# Patient Record
Sex: FEMALE | Race: WHITE | NOT HISPANIC OR LATINO | Employment: FULL TIME | URBAN - NONMETROPOLITAN AREA
[De-identification: names, ages, dates, MRNs, and addresses within clinical notes are randomized per-mention and may not be internally consistent; named-entity substitution may affect disease eponyms.]

---

## 2017-03-02 ENCOUNTER — HOSPITAL ENCOUNTER (EMERGENCY)
Facility: HOSPITAL | Age: 47
Discharge: HOME | End: 2017-03-02
Attending: EMERGENCY MEDICINE | Admitting: EMERGENCY MEDICINE
Payer: COMMERCIAL

## 2017-03-02 VITALS
TEMPERATURE: 99.1 F | HEIGHT: 67 IN | WEIGHT: 130.07 LBS | DIASTOLIC BLOOD PRESSURE: 66 MMHG | OXYGEN SATURATION: 98 % | HEART RATE: 84 BPM | BODY MASS INDEX: 20.42 KG/M2 | SYSTOLIC BLOOD PRESSURE: 112 MMHG | RESPIRATION RATE: 16 BRPM

## 2017-03-02 DIAGNOSIS — J32.1 FRONTAL SINUSITIS: Primary | ICD-10-CM

## 2017-03-02 DIAGNOSIS — H65.90 SEROUS OTITIS MEDIA: ICD-10-CM

## 2017-03-02 PROCEDURE — 99283 EMERGENCY DEPT VISIT LOW MDM: CPT

## 2017-03-02 RX ORDER — PREDNISONE 20 MG/1
TABLET ORAL
Qty: 15 TABLET | Refills: 0 | Status: SHIPPED | OUTPATIENT
Start: 2017-03-02 | End: 2017-05-05 | Stop reason: ALTCHOICE

## 2017-05-05 ENCOUNTER — HOSPITAL ENCOUNTER (EMERGENCY)
Facility: HOSPITAL | Age: 47
Discharge: HOME | End: 2017-05-05
Attending: EMERGENCY MEDICINE | Admitting: EMERGENCY MEDICINE
Payer: COMMERCIAL

## 2017-05-05 VITALS
OXYGEN SATURATION: 100 % | BODY MASS INDEX: 20.89 KG/M2 | SYSTOLIC BLOOD PRESSURE: 143 MMHG | HEART RATE: 77 BPM | WEIGHT: 130 LBS | HEIGHT: 66 IN | RESPIRATION RATE: 16 BRPM | TEMPERATURE: 98.2 F | DIASTOLIC BLOOD PRESSURE: 71 MMHG

## 2017-05-05 DIAGNOSIS — J06.9 URI (UPPER RESPIRATORY INFECTION): ICD-10-CM

## 2017-05-05 DIAGNOSIS — J02.9 PHARYNGITIS: Primary | ICD-10-CM

## 2017-05-05 PROCEDURE — 99283 EMERGENCY DEPT VISIT LOW MDM: CPT

## 2017-05-05 RX ORDER — GUAIFENESIN, PSEUDOEPHEDRINE HYDROCHLORIDE 600; 60 MG/1; MG/1
1 TABLET, EXTENDED RELEASE ORAL EVERY 12 HOURS
Qty: 20 TABLET | Refills: 0 | Status: SHIPPED | OUTPATIENT
Start: 2017-05-05 | End: 2017-06-04

## 2017-05-05 RX ORDER — AMOXICILLIN AND CLAVULANATE POTASSIUM 875; 125 MG/1; MG/1
1 TABLET, FILM COATED ORAL EVERY 12 HOURS
Qty: 20 TABLET | Refills: 0 | Status: SHIPPED | OUTPATIENT
Start: 2017-05-05 | End: 2017-05-15

## 2017-05-19 ENCOUNTER — APPOINTMENT (EMERGENCY)
Dept: ULTRASOUND IMAGING | Facility: HOSPITAL | Age: 47
End: 2017-05-19
Payer: COMMERCIAL

## 2017-05-19 ENCOUNTER — APPOINTMENT (EMERGENCY)
Dept: RADIOLOGY | Facility: HOSPITAL | Age: 47
End: 2017-05-19
Payer: COMMERCIAL

## 2017-05-19 ENCOUNTER — HOSPITAL ENCOUNTER (EMERGENCY)
Facility: HOSPITAL | Age: 47
Discharge: HOME | End: 2017-05-19
Admitting: EMERGENCY MEDICINE
Payer: COMMERCIAL

## 2017-05-19 VITALS
HEART RATE: 74 BPM | DIASTOLIC BLOOD PRESSURE: 70 MMHG | OXYGEN SATURATION: 99 % | HEIGHT: 66 IN | WEIGHT: 129.13 LBS | SYSTOLIC BLOOD PRESSURE: 136 MMHG | RESPIRATION RATE: 17 BRPM | TEMPERATURE: 99 F | BODY MASS INDEX: 20.75 KG/M2

## 2017-05-19 DIAGNOSIS — M25.561 MEDIAL JOINT LINE TENDERNESS OF RIGHT KNEE: Primary | ICD-10-CM

## 2017-05-19 PROCEDURE — 99284 EMERGENCY DEPT VISIT MOD MDM: CPT

## 2017-05-19 PROCEDURE — 93971 EXTREMITY STUDY: CPT

## 2017-05-19 PROCEDURE — 73564 X-RAY EXAM KNEE 4 OR MORE: CPT

## 2017-05-23 ENCOUNTER — HOSPITAL ENCOUNTER (EMERGENCY)
Facility: HOSPITAL | Age: 47
Discharge: HOME | End: 2017-05-23
Payer: COMMERCIAL

## 2017-05-23 VITALS
HEART RATE: 60 BPM | BODY MASS INDEX: 20.76 KG/M2 | WEIGHT: 129.19 LBS | HEIGHT: 66 IN | OXYGEN SATURATION: 100 % | DIASTOLIC BLOOD PRESSURE: 75 MMHG | TEMPERATURE: 99.1 F | RESPIRATION RATE: 17 BRPM | SYSTOLIC BLOOD PRESSURE: 128 MMHG

## 2017-05-23 DIAGNOSIS — R55 NEAR SYNCOPE: Primary | ICD-10-CM

## 2017-05-23 LAB
ANION GAP SERPL CALCULATED.3IONS-SCNC: 9 MMOL/L (ref 4–13)
ATRIAL RATE: 67 BPM
BASOPHILS # BLD AUTO: 0.01 THOUSANDS/ΜL (ref 0–0.1)
BASOPHILS NFR BLD AUTO: 0 % (ref 0–1)
BUN SERPL-MCNC: 14 MG/DL (ref 5–25)
CALCIUM SERPL-MCNC: 9.5 MG/DL (ref 8.3–10.1)
CHLORIDE SERPL-SCNC: 103 MMOL/L (ref 100–108)
CO2 SERPL-SCNC: 27 MMOL/L (ref 21–32)
CREAT SERPL-MCNC: 0.82 MG/DL (ref 0.6–1.3)
EOSINOPHIL # BLD AUTO: 0.11 THOUSAND/ΜL (ref 0–0.61)
EOSINOPHIL NFR BLD AUTO: 1 % (ref 0–6)
ERYTHROCYTE [DISTWIDTH] IN BLOOD BY AUTOMATED COUNT: 11.9 % (ref 11.6–15.1)
GFR SERPL CREATININE-BSD FRML MDRD: >60 ML/MIN/1.73SQ M
GLUCOSE SERPL-MCNC: 98 MG/DL (ref 65–140)
HCT VFR BLD AUTO: 42.7 % (ref 34.8–46.1)
HGB BLD-MCNC: 14 G/DL (ref 11.5–15.4)
HOLD SPECIMEN: NORMAL
LYMPHOCYTES # BLD AUTO: 3.53 THOUSANDS/ΜL (ref 0.6–4.47)
LYMPHOCYTES NFR BLD AUTO: 35 % (ref 14–44)
MCH RBC QN AUTO: 31.7 PG (ref 26.8–34.3)
MCHC RBC AUTO-ENTMCNC: 32.8 G/DL (ref 31.4–37.4)
MCV RBC AUTO: 97 FL (ref 82–98)
MONOCYTES # BLD AUTO: 0.72 THOUSAND/ΜL (ref 0.17–1.22)
MONOCYTES NFR BLD AUTO: 7 % (ref 4–12)
NEUTROPHILS # BLD AUTO: 5.67 THOUSANDS/ΜL (ref 1.85–7.62)
NEUTS SEG NFR BLD AUTO: 57 % (ref 43–75)
P AXIS: 63 DEGREES
PLATELET # BLD AUTO: 299 THOUSANDS/UL (ref 149–390)
PMV BLD AUTO: 9.3 FL (ref 8.9–12.7)
POTASSIUM SERPL-SCNC: 3.5 MMOL/L (ref 3.5–5.3)
PR INTERVAL: 156 MS
QRS AXIS: 45 DEGREES
QRSD INTERVAL: 122 MS
QT INTERVAL: 424 MS
QTC INTERVAL: 448 MS
RBC # BLD AUTO: 4.41 MILLION/UL (ref 3.81–5.12)
SODIUM SERPL-SCNC: 139 MMOL/L (ref 136–145)
T WAVE AXIS: 74 DEGREES
TROPONIN I SERPL-MCNC: <0.02 NG/ML
VENTRICULAR RATE: 67 BPM
WBC # BLD AUTO: 10.04 THOUSAND/UL (ref 4.31–10.16)

## 2017-05-23 PROCEDURE — 80048 BASIC METABOLIC PNL TOTAL CA: CPT

## 2017-05-23 PROCEDURE — 84484 ASSAY OF TROPONIN QUANT: CPT | Performed by: PHYSICIAN ASSISTANT

## 2017-05-23 PROCEDURE — 99284 EMERGENCY DEPT VISIT MOD MDM: CPT

## 2017-05-23 PROCEDURE — 85025 COMPLETE CBC W/AUTO DIFF WBC: CPT

## 2017-05-23 PROCEDURE — 93005 ELECTROCARDIOGRAM TRACING: CPT | Performed by: PHYSICIAN ASSISTANT

## 2017-05-23 PROCEDURE — 36415 COLL VENOUS BLD VENIPUNCTURE: CPT | Performed by: PHYSICIAN ASSISTANT

## 2017-05-31 ENCOUNTER — TRANSCRIBE ORDERS (OUTPATIENT)
Dept: ADMINISTRATIVE | Facility: HOSPITAL | Age: 47
End: 2017-05-31

## 2017-05-31 ENCOUNTER — ALLSCRIPTS OFFICE VISIT (OUTPATIENT)
Dept: OTHER | Facility: OTHER | Age: 47
End: 2017-05-31

## 2017-05-31 DIAGNOSIS — M25.50 PAIN IN JOINT: ICD-10-CM

## 2017-05-31 DIAGNOSIS — M23.91 INTERNAL DERANGEMENT OF RIGHT KNEE: ICD-10-CM

## 2017-05-31 DIAGNOSIS — M23.91 DERANGEMENT OF COLLATERAL LIGAMENT OF RIGHT KNEE: Primary | ICD-10-CM

## 2017-06-05 ENCOUNTER — LAB (OUTPATIENT)
Dept: LAB | Facility: HOSPITAL | Age: 47
End: 2017-06-05
Attending: ORTHOPAEDIC SURGERY
Payer: COMMERCIAL

## 2017-06-05 ENCOUNTER — TRANSCRIBE ORDERS (OUTPATIENT)
Dept: ADMINISTRATIVE | Facility: HOSPITAL | Age: 47
End: 2017-06-05

## 2017-06-05 DIAGNOSIS — M23.91 INTERNAL DERANGEMENT OF RIGHT KNEE: ICD-10-CM

## 2017-06-05 LAB
ANION GAP SERPL CALCULATED.3IONS-SCNC: 8 MMOL/L (ref 4–13)
BASOPHILS # BLD AUTO: 0.02 THOUSANDS/ΜL (ref 0–0.1)
BASOPHILS NFR BLD AUTO: 0 % (ref 0–1)
BUN SERPL-MCNC: 14 MG/DL (ref 5–25)
CALCIUM SERPL-MCNC: 9.3 MG/DL (ref 8.3–10.1)
CHLORIDE SERPL-SCNC: 103 MMOL/L (ref 100–108)
CO2 SERPL-SCNC: 28 MMOL/L (ref 21–32)
CREAT SERPL-MCNC: 0.85 MG/DL (ref 0.6–1.3)
CRP SERPL QL: <3 MG/L
EOSINOPHIL # BLD AUTO: 0.17 THOUSAND/ΜL (ref 0–0.61)
EOSINOPHIL NFR BLD AUTO: 2 % (ref 0–6)
ERYTHROCYTE [DISTWIDTH] IN BLOOD BY AUTOMATED COUNT: 11.8 % (ref 11.6–15.1)
GFR SERPL CREATININE-BSD FRML MDRD: >60 ML/MIN/1.73SQ M
GLUCOSE SERPL-MCNC: 82 MG/DL (ref 65–140)
HCT VFR BLD AUTO: 41.1 % (ref 34.8–46.1)
HGB BLD-MCNC: 13.5 G/DL (ref 11.5–15.4)
LYMPHOCYTES # BLD AUTO: 3.64 THOUSANDS/ΜL (ref 0.6–4.47)
LYMPHOCYTES NFR BLD AUTO: 39 % (ref 14–44)
MCH RBC QN AUTO: 32 PG (ref 26.8–34.3)
MCHC RBC AUTO-ENTMCNC: 32.8 G/DL (ref 31.4–37.4)
MCV RBC AUTO: 97 FL (ref 82–98)
MONOCYTES # BLD AUTO: 0.75 THOUSAND/ΜL (ref 0.17–1.22)
MONOCYTES NFR BLD AUTO: 8 % (ref 4–12)
NEUTROPHILS # BLD AUTO: 4.8 THOUSANDS/ΜL (ref 1.85–7.62)
NEUTS SEG NFR BLD AUTO: 51 % (ref 43–75)
PLATELET # BLD AUTO: 342 THOUSANDS/UL (ref 149–390)
PMV BLD AUTO: 9.3 FL (ref 8.9–12.7)
POTASSIUM SERPL-SCNC: 3.9 MMOL/L (ref 3.5–5.3)
RBC # BLD AUTO: 4.22 MILLION/UL (ref 3.81–5.12)
SODIUM SERPL-SCNC: 139 MMOL/L (ref 136–145)
WBC # BLD AUTO: 9.38 THOUSAND/UL (ref 4.31–10.16)

## 2017-06-05 PROCEDURE — 85025 COMPLETE CBC W/AUTO DIFF WBC: CPT

## 2017-06-05 PROCEDURE — 86618 LYME DISEASE ANTIBODY: CPT

## 2017-06-05 PROCEDURE — 80048 BASIC METABOLIC PNL TOTAL CA: CPT

## 2017-06-05 PROCEDURE — 86140 C-REACTIVE PROTEIN: CPT

## 2017-06-05 PROCEDURE — 86430 RHEUMATOID FACTOR TEST QUAL: CPT

## 2017-06-05 PROCEDURE — 36415 COLL VENOUS BLD VENIPUNCTURE: CPT

## 2017-06-06 LAB — RHEUMATOID FACT SER QL LA: NEGATIVE

## 2017-06-07 LAB
B BURGDOR IGG SER IA-ACNC: 0.09
B BURGDOR IGM SER IA-ACNC: 0.15

## 2017-07-07 ENCOUNTER — OFFICE VISIT (OUTPATIENT)
Dept: URGENT CARE | Facility: CLINIC | Age: 47
End: 2017-07-07
Payer: COMMERCIAL

## 2017-07-07 PROCEDURE — 87430 STREP A AG IA: CPT

## 2017-07-07 PROCEDURE — G0382 LEV 3 HOSP TYPE B ED VISIT: HCPCS

## 2017-07-07 PROCEDURE — 99283 EMERGENCY DEPT VISIT LOW MDM: CPT

## 2018-01-14 VITALS
BODY MASS INDEX: 20.25 KG/M2 | HEIGHT: 67 IN | WEIGHT: 129 LBS | DIASTOLIC BLOOD PRESSURE: 74 MMHG | SYSTOLIC BLOOD PRESSURE: 118 MMHG | HEART RATE: 87 BPM

## 2020-05-06 LAB — EXT SARS-COV-2: NOT DETECTED

## 2020-07-07 ENCOUNTER — OFFICE VISIT (OUTPATIENT)
Dept: FAMILY MEDICINE CLINIC | Facility: CLINIC | Age: 50
End: 2020-07-07
Payer: COMMERCIAL

## 2020-07-07 VITALS
HEIGHT: 67 IN | DIASTOLIC BLOOD PRESSURE: 68 MMHG | SYSTOLIC BLOOD PRESSURE: 134 MMHG | BODY MASS INDEX: 23.7 KG/M2 | RESPIRATION RATE: 20 BRPM | HEART RATE: 84 BPM | WEIGHT: 151 LBS

## 2020-07-07 DIAGNOSIS — J45.40 MODERATE PERSISTENT ASTHMA WITHOUT COMPLICATION: ICD-10-CM

## 2020-07-07 DIAGNOSIS — F31.70 BIPOLAR DISORDER IN FULL REMISSION, MOST RECENT EPISODE UNSPECIFIED TYPE (HCC): Primary | ICD-10-CM

## 2020-07-07 PROCEDURE — 99213 OFFICE O/P EST LOW 20 MIN: CPT | Performed by: FAMILY MEDICINE

## 2020-07-07 PROCEDURE — 3008F BODY MASS INDEX DOCD: CPT | Performed by: FAMILY MEDICINE

## 2020-07-07 PROCEDURE — 1036F TOBACCO NON-USER: CPT | Performed by: FAMILY MEDICINE

## 2020-07-07 RX ORDER — LITHIUM CARBONATE 300 MG/1
300 CAPSULE ORAL
COMMUNITY
End: 2020-07-07 | Stop reason: SDUPTHER

## 2020-07-07 RX ORDER — ALPRAZOLAM 0.25 MG/1
0.25 TABLET ORAL AS NEEDED
Qty: 90 TABLET | Refills: 0 | Status: SHIPPED | OUTPATIENT
Start: 2020-07-07 | End: 2020-09-23 | Stop reason: SDUPTHER

## 2020-07-07 RX ORDER — CLONIDINE HYDROCHLORIDE 0.2 MG/1
0.2 TABLET ORAL DAILY
Qty: 30 TABLET | Refills: 2 | Status: SHIPPED | OUTPATIENT
Start: 2020-07-07 | End: 2020-09-21

## 2020-07-07 RX ORDER — FLUTICASONE PROPIONATE 44 UG/1
2 AEROSOL, METERED RESPIRATORY (INHALATION) 2 TIMES DAILY
Qty: 1 INHALER | Refills: 5 | Status: SHIPPED | OUTPATIENT
Start: 2020-07-07 | End: 2020-09-23

## 2020-07-07 RX ORDER — LITHIUM CARBONATE 300 MG/1
300 CAPSULE ORAL
Qty: 90 CAPSULE | Refills: 2 | Status: SHIPPED | OUTPATIENT
Start: 2020-07-07 | End: 2020-09-23 | Stop reason: SDUPTHER

## 2020-07-07 NOTE — PROGRESS NOTES
Assessment/Plan:  Chronic cough and a previous smoker plan and a chest x-ray and pulmonary function test the patient has been using Atrovent with some success but there is no full documentation  Bipolar disorder controlled on current regimen and anxiety with panic controlled with Apresoline    Problem List Items Addressed This Visit     None           There are no diagnoses linked to this encounter  No problem-specific Assessment & Plan notes found for this encounter  PHQ-9 Depression Screening    PHQ-9:    Frequency of the following problems over the past two weeks:       Little interest or pleasure in doing things:  0 - not at all  Feeling down, depressed, or hopeless:  0 - not at all  PHQ-2 Score:  0          Body mass index is 23 65 kg/m²  BMI Counseling: Body mass index is 23 65 kg/m²  The BMI     Subjective:      Patient ID: Robson Alba is a 44 y o  female  States that she has a persistent cough times months      The following portions of the patient's history were reviewed and updated as appropriate:   She has a past medical history of ADD (attention deficit disorder), Colitis, ulcerative ([address] ), and Depression  ,  does not have a problem list on file  ,   has a past surgical history that includes Tubal ligation and Endometrial ablation  ,  family history is not on file  ,   reports that she quit smoking about 6 years ago  She has never used smokeless tobacco  She reports that she does not drink alcohol or use drugs  ,  has No Known Allergies     Current Outpatient Medications   Medication Sig Dispense Refill    ALPRAZolam (XANAX) 0 25 mg tablet Take 0 25 mg by mouth as needed for anxiety   cloNIDine (CATAPRES) 0 2 mg tablet Take 0 2 mg by mouth daily       Conj Estrog-Medroxyprogest Ace (PREMPRO PO) Take 1 tablet by mouth daily        ipratropium (ATROVENT HFA) 17 mcg/act inhaler Inhale 2 puffs 4 (four) times a day      lithium carbonate 300 mg capsule Take 300 mg by mouth 3 (three) times a day with meals      diclofenac sodium (VOLTAREN) 1 % Apply 2 g topically 4 (four) times a day for 7 days 56 g 0     No current facility-administered medications for this visit  Review of Systems   Constitutional: Negative  HENT: Negative  Eyes: Negative  Respiratory: Positive for cough  Cardiovascular: Negative  Gastrointestinal: Negative  Endocrine: Negative  Genitourinary: Negative  Musculoskeletal: Negative  Skin: Negative  Allergic/Immunologic: Negative  Neurological: Negative  Hematological: Negative  Psychiatric/Behavioral: The patient is nervous/anxious  Objective:    /68   Pulse 84   Resp 20   Ht 5' 7" (1 702 m)   Wt 68 5 kg (151 lb)   LMP 05/02/2016 (Approximate) Comment: pre-menopause  BMI 23 65 kg/m²   Body mass index is 23 65 kg/m²  Physical Exam   Constitutional: She is oriented to person, place, and time  She appears well-developed and well-nourished  HENT:   Head: Normocephalic  Eyes: Pupils are equal, round, and reactive to light  Neck: Normal range of motion  Cardiovascular: Normal rate, regular rhythm and normal heart sounds  Pulmonary/Chest: Effort normal and breath sounds normal    Abdominal: Soft  Bowel sounds are normal  There is no tenderness  Neurological: She is alert and oriented to person, place, and time  Skin: Skin is warm  Psychiatric: She has a normal mood and affect

## 2020-09-19 DIAGNOSIS — F31.70 BIPOLAR DISORDER IN FULL REMISSION, MOST RECENT EPISODE UNSPECIFIED TYPE (HCC): ICD-10-CM

## 2020-09-21 RX ORDER — CLONIDINE HYDROCHLORIDE 0.2 MG/1
TABLET ORAL
Qty: 30 TABLET | Refills: 2 | Status: SHIPPED | OUTPATIENT
Start: 2020-09-21 | End: 2020-09-23 | Stop reason: SDUPTHER

## 2020-09-23 ENCOUNTER — OFFICE VISIT (OUTPATIENT)
Dept: FAMILY MEDICINE CLINIC | Facility: CLINIC | Age: 50
End: 2020-09-23
Payer: COMMERCIAL

## 2020-09-23 VITALS
HEART RATE: 68 BPM | SYSTOLIC BLOOD PRESSURE: 130 MMHG | WEIGHT: 153 LBS | BODY MASS INDEX: 24.01 KG/M2 | TEMPERATURE: 99.2 F | DIASTOLIC BLOOD PRESSURE: 84 MMHG | RESPIRATION RATE: 20 BRPM | HEIGHT: 67 IN

## 2020-09-23 DIAGNOSIS — L40.9 PSORIASIS: Primary | ICD-10-CM

## 2020-09-23 DIAGNOSIS — F31.70 BIPOLAR DISORDER IN FULL REMISSION, MOST RECENT EPISODE UNSPECIFIED TYPE (HCC): ICD-10-CM

## 2020-09-23 PROCEDURE — 1036F TOBACCO NON-USER: CPT | Performed by: FAMILY MEDICINE

## 2020-09-23 PROCEDURE — 99213 OFFICE O/P EST LOW 20 MIN: CPT | Performed by: FAMILY MEDICINE

## 2020-09-23 RX ORDER — ALPRAZOLAM 0.25 MG/1
0.25 TABLET ORAL AS NEEDED
Qty: 90 TABLET | Refills: 0 | Status: SHIPPED | OUTPATIENT
Start: 2020-09-23 | End: 2020-12-09

## 2020-09-23 RX ORDER — FENOPROFEN CALCIUM 200 MG
CAPSULE ORAL 2 TIMES DAILY
Qty: 118 ML | Refills: 0 | Status: SHIPPED | OUTPATIENT
Start: 2020-09-23

## 2020-09-23 RX ORDER — CLONIDINE HYDROCHLORIDE 0.2 MG/1
0.2 TABLET ORAL DAILY
Qty: 30 TABLET | Refills: 2 | Status: SHIPPED | OUTPATIENT
Start: 2020-09-23 | End: 2020-12-09 | Stop reason: SDUPTHER

## 2020-09-23 RX ORDER — LITHIUM CARBONATE 300 MG/1
300 CAPSULE ORAL
Qty: 90 CAPSULE | Refills: 2 | Status: SHIPPED | OUTPATIENT
Start: 2020-09-23 | End: 2020-12-09 | Stop reason: SDUPTHER

## 2020-09-23 NOTE — PROGRESS NOTES
Assessment/Plan:    Problem List Items Addressed This Visit     None      Visit Diagnoses     Bipolar disorder in full remission, most recent episode unspecified type ([facility] [address] )        Relevant Medications    ALPRAZolam (XANAX) 0 25 mg tablet    cloNIDine (CATAPRES) 0 2 mg tablet    lithium carbonate 300 mg capsule           Diagnoses and all orders for this visit:    Bipolar disorder in full remission, most recent episode unspecified type ([facility])  -     ALPRAZolam (XANAX) 0 25 mg tablet; Take 1 tablet (0 25 mg total) by mouth as needed for anxiety  -     cloNIDine (CATAPRES) 0 2 mg tablet; Take 1 tablet (0 2 mg total) by mouth daily  -     lithium carbonate 300 mg capsule; Take 1 capsule (300 mg total) by mouth 3 (three) times a day with meals        No problem-specific Assessment & Plan notes found for this encounter  PHQ-9 Depression Screening    PHQ-9:    Frequency of the following problems over the past two weeks: Body mass index is 23 96 kg/m²  BMI Counseling: Body mass index is 23 96 kg/m²  The BMI     Subjective:      Patient ID: Jabier Severe is a 44 y o  female  Patient presents for routine checkup for probable or disorder      The following portions of the patient's history were reviewed and updated as appropriate:   She has a past medical history of ADD (attention deficit disorder), Colitis, ulcerative ([facility] [address] ), and Depression  ,  does not have a problem list on file  ,   has a past surgical history that includes Tubal ligation and Endometrial ablation  ,  family history is not on file  ,   reports that she quit smoking about 6 years ago  She has never used smokeless tobacco  She reports that she does not drink alcohol or use drugs  ,  has No Known Allergies     Current Outpatient Medications   Medication Sig Dispense Refill    ALPRAZolam (XANAX) 0 25 mg tablet Take 1 tablet (0 25 mg total) by mouth as needed for anxiety 90 tablet 0    cloNIDine (CATAPRES) 0 2 mg tablet Take 1 tablet (0 2 mg total) by mouth daily 30 tablet 2    Conj Estrog-Medroxyprogest Ace (PREMPRO PO) Take 1 tablet by mouth daily   ipratropium (ATROVENT HFA) 17 mcg/act inhaler Inhale 2 puffs 4 (four) times a day      lithium carbonate 300 mg capsule Take 1 capsule (300 mg total) by mouth 3 (three) times a day with meals 90 capsule 2     No current facility-administered medications for this visit  Review of Systems   Constitutional: Negative  HENT: Negative  Eyes: Negative  Respiratory: Negative  Cardiovascular: Negative  Gastrointestinal: Negative  Endocrine: Negative  Genitourinary: Negative  Musculoskeletal: Positive for arthralgias and myalgias  Skin: Negative  HE flaky rash at scalp on   Allergic/Immunologic: Negative  Neurological: Negative  Hematological: Negative  Psychiatric/Behavioral: The patient is nervous/anxious  Objective:    /84   Pulse 68   Temp 99 2 °F (37 3 °C)   Resp 20   Ht 5' 7" (1 702 m)   Wt 69 4 kg (153 lb)   LMP 05/02/2016 (Approximate) Comment: pre-menopause  BMI 23 96 kg/m²   Body mass index is 23 96 kg/m²  Physical Exam  Constitutional:       Appearance: She is well-developed  HENT:      Head: Normocephalic  Eyes:      Pupils: Pupils are equal, round, and reactive to light  Neck:      Musculoskeletal: Normal range of motion  Cardiovascular:      Rate and Rhythm: Normal rate and regular rhythm  Heart sounds: Normal heart sounds  Pulmonary:      Effort: Pulmonary effort is normal       Breath sounds: Normal breath sounds  Abdominal:      General: Bowel sounds are normal       Palpations: Abdomen is soft  Tenderness: There is no abdominal tenderness  Skin:     General: Skin is warm  Comments: Psoriatic like a rash to scalp   Neurological:      Mental Status: She is alert and oriented to person, place, and time

## 2020-10-12 ENCOUNTER — TELEPHONE (OUTPATIENT)
Dept: ADMINISTRATIVE | Facility: OTHER | Age: 50
End: 2020-10-12

## 2020-11-12 ENCOUNTER — TELEPHONE (OUTPATIENT)
Dept: FAMILY MEDICINE CLINIC | Facility: CLINIC | Age: 50
End: 2020-11-12

## 2020-11-12 DIAGNOSIS — Z13.220 SCREENING FOR CHOLESTEROL LEVEL: Primary | ICD-10-CM

## 2020-11-13 ENCOUNTER — TELEPHONE (OUTPATIENT)
Dept: ADMINISTRATIVE | Facility: OTHER | Age: 50
End: 2020-11-13

## 2020-12-09 ENCOUNTER — OFFICE VISIT (OUTPATIENT)
Dept: FAMILY MEDICINE CLINIC | Facility: CLINIC | Age: 50
End: 2020-12-09
Payer: COMMERCIAL

## 2020-12-09 VITALS
SYSTOLIC BLOOD PRESSURE: 126 MMHG | HEART RATE: 64 BPM | BODY MASS INDEX: 24.48 KG/M2 | WEIGHT: 156 LBS | OXYGEN SATURATION: 98 % | HEIGHT: 67 IN | TEMPERATURE: 97.7 F | RESPIRATION RATE: 20 BRPM | DIASTOLIC BLOOD PRESSURE: 84 MMHG

## 2020-12-09 DIAGNOSIS — F31.70 BIPOLAR DISORDER IN FULL REMISSION, MOST RECENT EPISODE UNSPECIFIED TYPE (HCC): ICD-10-CM

## 2020-12-09 DIAGNOSIS — F41.0 SEVERE ANXIETY WITH PANIC: ICD-10-CM

## 2020-12-09 DIAGNOSIS — E55.9 VITAMIN D DEFICIENCY: ICD-10-CM

## 2020-12-09 DIAGNOSIS — R11.0 NAUSEA: ICD-10-CM

## 2020-12-09 DIAGNOSIS — R52 PAIN: ICD-10-CM

## 2020-12-09 DIAGNOSIS — Z13.220 SCREENING FOR CHOLESTEROL LEVEL: ICD-10-CM

## 2020-12-09 DIAGNOSIS — J45.40 MODERATE PERSISTENT ASTHMA WITHOUT COMPLICATION: Primary | ICD-10-CM

## 2020-12-09 DIAGNOSIS — Z13.29 SCREENING FOR THYROID DISORDER: ICD-10-CM

## 2020-12-09 PROCEDURE — 3008F BODY MASS INDEX DOCD: CPT | Performed by: FAMILY MEDICINE

## 2020-12-09 PROCEDURE — 99214 OFFICE O/P EST MOD 30 MIN: CPT | Performed by: FAMILY MEDICINE

## 2020-12-09 PROCEDURE — 1036F TOBACCO NON-USER: CPT | Performed by: FAMILY MEDICINE

## 2020-12-09 RX ORDER — LITHIUM CARBONATE 300 MG/1
300 CAPSULE ORAL
Qty: 90 CAPSULE | Refills: 2 | Status: SHIPPED | OUTPATIENT
Start: 2020-12-09 | End: 2021-04-19

## 2020-12-09 RX ORDER — ALPRAZOLAM 0.25 MG/1
0.25 TABLET ORAL AS NEEDED
Qty: 90 TABLET | Refills: 0 | Status: CANCELLED | OUTPATIENT
Start: 2020-12-09

## 2020-12-09 RX ORDER — ALPRAZOLAM 0.5 MG/1
0.5 TABLET ORAL 3 TIMES DAILY PRN
Qty: 90 TABLET | Refills: 2 | Status: SHIPPED | OUTPATIENT
Start: 2020-12-09 | End: 2021-06-07 | Stop reason: SDUPTHER

## 2020-12-09 RX ORDER — ONDANSETRON 4 MG/1
4 TABLET, FILM COATED ORAL EVERY 8 HOURS PRN
Qty: 20 TABLET | Refills: 0 | Status: SHIPPED | OUTPATIENT
Start: 2020-12-09 | End: 2022-01-24 | Stop reason: SDUPTHER

## 2020-12-09 RX ORDER — CLONIDINE HYDROCHLORIDE 0.2 MG/1
0.2 TABLET ORAL DAILY
Qty: 30 TABLET | Refills: 2 | Status: SHIPPED | OUTPATIENT
Start: 2020-12-09 | End: 2021-05-17 | Stop reason: SDUPTHER

## 2020-12-29 ENCOUNTER — TELEPHONE (OUTPATIENT)
Dept: FAMILY MEDICINE CLINIC | Facility: CLINIC | Age: 50
End: 2020-12-29

## 2021-02-26 ENCOUNTER — TELEPHONE (OUTPATIENT)
Dept: FAMILY MEDICINE CLINIC | Facility: CLINIC | Age: 51
End: 2021-02-26

## 2021-03-30 ENCOUNTER — TELEPHONE (OUTPATIENT)
Dept: FAMILY MEDICINE CLINIC | Facility: CLINIC | Age: 51
End: 2021-03-30

## 2021-03-30 NOTE — TELEPHONE ENCOUNTER
Unable to contact patient by phone - will send message thru [facility]

## 2021-04-17 DIAGNOSIS — F31.70 BIPOLAR DISORDER IN FULL REMISSION, MOST RECENT EPISODE UNSPECIFIED TYPE (HCC): ICD-10-CM

## 2021-04-19 RX ORDER — LITHIUM CARBONATE 300 MG/1
CAPSULE ORAL
Qty: 90 CAPSULE | Refills: 2 | Status: SHIPPED | OUTPATIENT
Start: 2021-04-19 | End: 2021-06-07 | Stop reason: SDUPTHER

## 2021-05-17 DIAGNOSIS — F31.70 BIPOLAR DISORDER IN FULL REMISSION, MOST RECENT EPISODE UNSPECIFIED TYPE (HCC): ICD-10-CM

## 2021-05-17 RX ORDER — CLONIDINE HYDROCHLORIDE 0.2 MG/1
0.2 TABLET ORAL DAILY
Qty: 15 TABLET | Refills: 0 | Status: SHIPPED | OUTPATIENT
Start: 2021-05-17 | End: 2021-06-02

## 2021-05-17 NOTE — TELEPHONE ENCOUNTER
Made apt for 06/07/2021 @3:00  Tried to contact patient regarding refills she does have on her lithium - VM box is full

## 2021-06-02 DIAGNOSIS — F31.70 BIPOLAR DISORDER IN FULL REMISSION, MOST RECENT EPISODE UNSPECIFIED TYPE (HCC): ICD-10-CM

## 2021-06-02 RX ORDER — CLONIDINE HYDROCHLORIDE 0.2 MG/1
TABLET ORAL
Qty: 15 TABLET | Refills: 0 | Status: SHIPPED | OUTPATIENT
Start: 2021-06-02 | End: 2021-06-07 | Stop reason: SDUPTHER

## 2021-06-07 ENCOUNTER — OFFICE VISIT (OUTPATIENT)
Dept: FAMILY MEDICINE CLINIC | Facility: CLINIC | Age: 51
End: 2021-06-07
Payer: COMMERCIAL

## 2021-06-07 VITALS
HEIGHT: 67 IN | TEMPERATURE: 99.1 F | BODY MASS INDEX: 22.76 KG/M2 | RESPIRATION RATE: 20 BRPM | SYSTOLIC BLOOD PRESSURE: 124 MMHG | DIASTOLIC BLOOD PRESSURE: 68 MMHG | HEART RATE: 84 BPM | WEIGHT: 145 LBS

## 2021-06-07 DIAGNOSIS — J45.40 MODERATE PERSISTENT ASTHMA WITHOUT COMPLICATION: ICD-10-CM

## 2021-06-07 DIAGNOSIS — M54.50 CHRONIC BILATERAL LOW BACK PAIN WITHOUT SCIATICA: Primary | ICD-10-CM

## 2021-06-07 DIAGNOSIS — G89.29 CHRONIC BILATERAL LOW BACK PAIN WITHOUT SCIATICA: Primary | ICD-10-CM

## 2021-06-07 DIAGNOSIS — F41.0 SEVERE ANXIETY WITH PANIC: ICD-10-CM

## 2021-06-07 DIAGNOSIS — F31.70 BIPOLAR DISORDER IN FULL REMISSION, MOST RECENT EPISODE UNSPECIFIED TYPE (HCC): ICD-10-CM

## 2021-06-07 PROCEDURE — 1036F TOBACCO NON-USER: CPT | Performed by: FAMILY MEDICINE

## 2021-06-07 PROCEDURE — 99214 OFFICE O/P EST MOD 30 MIN: CPT | Performed by: FAMILY MEDICINE

## 2021-06-07 PROCEDURE — 3008F BODY MASS INDEX DOCD: CPT | Performed by: FAMILY MEDICINE

## 2021-06-07 RX ORDER — LITHIUM CARBONATE 300 MG/1
900 CAPSULE ORAL
Qty: 90 CAPSULE | Refills: 2 | Status: SHIPPED | OUTPATIENT
Start: 2021-06-07 | End: 2021-08-17 | Stop reason: SDUPTHER

## 2021-06-07 RX ORDER — CLONIDINE HYDROCHLORIDE 0.2 MG/1
0.2 TABLET ORAL DAILY
Qty: 90 TABLET | Refills: 1 | Status: SHIPPED | OUTPATIENT
Start: 2021-06-07 | End: 2021-11-29

## 2021-06-07 RX ORDER — ALPRAZOLAM 0.5 MG/1
0.5 TABLET ORAL 3 TIMES DAILY PRN
Qty: 90 TABLET | Refills: 2 | Status: SHIPPED | OUTPATIENT
Start: 2021-06-07 | End: 2021-08-17 | Stop reason: SDUPTHER

## 2021-06-07 NOTE — PROGRESS NOTES
Assessment/Plan: chronic anxiety with bipolar disorder medication improves function the patient is employed  Moderate persistent asthma without complication the patient uses rescue inhaler approximately 4 times per week pulmonary function test has been  Ordered    Bipolar disorder in full remission on current regimen    Chronic low back pain the patient is planning to return to chiropractic therapy      Problem List Items Addressed This Visit     None      Visit Diagnoses     Moderate persistent asthma without complication        Bipolar disorder in full remission, most recent episode unspecified type ([facility] )        Severe anxiety with panic               Diagnoses and all orders for this visit:    Moderate persistent asthma without complication  -     ipratropium (ATROVENT HFA) 17 mcg/act inhaler; Inhale 2 puffs 4 (four) times a day    Bipolar disorder in full remission, most recent episode unspecified type ([facility])  -     lithium carbonate 300 mg capsule; Take 3 capsules (900 mg total) by mouth daily at bedtime  -     cloNIDine (CATAPRES) 0 2 mg tablet; Take 1 tablet (0 2 mg total) by mouth daily    Severe anxiety with panic  -     ALPRAZolam (XANAX) 0 5 mg tablet; Take 1 tablet (0 5 mg total) by mouth 3 (three) times a day as needed for anxiety        No problem-specific Assessment & Plan notes found for this encounter  PHQ-9 Depression Screening    PHQ-9:   Frequency of the following problems over the past two weeks: Body mass index is 22 71 kg/m²  BMI Counseling: Body mass index is 22 71 kg/m²  The BMI     Subjective:      Patient ID: José Miguel Aguillon is a 46 y o  female  Patient presents for routine checkup      The following portions of the patient's history were reviewed and updated as appropriate:   She has a past medical history of ADD (attention deficit disorder), Colitis, ulcerative ([facility] ), and Depression  ,  does not have a problem list on file  ,   has a past surgical history that includes Tubal ligation and Endometrial ablation  ,  family history is not on file  ,   reports that she quit smoking about 7 years ago  She has never used smokeless tobacco  She reports that she does not drink alcohol or use drugs  ,  is allergic to dust mite extract     Current Outpatient Medications   Medication Sig Dispense Refill    ALPRAZolam (XANAX) 0 5 mg tablet Take 1 tablet (0 5 mg total) by mouth 3 (three) times a day as needed for anxiety 90 tablet 2    cloNIDine (CATAPRES) 0 2 mg tablet take 1 tablet by mouth daily 15 tablet 0    Conj Estrog-Medroxyprogest Ace (PREMPRO PO) Take 1 tablet by mouth daily   hydrocortisone 1 % lotion Apply topically 2 (two) times a day 118 mL 0    ipratropium (ATROVENT HFA) 17 mcg/act inhaler Inhale 2 puffs 4 (four) times a day 1 Inhaler 2    lithium carbonate 300 mg capsule take 1 capsule three times a day with meals 90 capsule 2    ondansetron (ZOFRAN) 4 mg tablet Take 1 tablet (4 mg total) by mouth every 8 (eight) hours as needed for nausea or vomiting 20 tablet 0     No current facility-administered medications for this visit  Review of Systems   Constitutional: Negative for chills and fever  HENT: Negative for ear pain and sore throat  Eyes: Negative for pain and visual disturbance  Respiratory: Positive for shortness of breath and wheezing  Negative for cough  Cardiovascular: Negative for chest pain and palpitations  Gastrointestinal: Negative for abdominal pain and vomiting  Genitourinary: Negative for dysuria and hematuria  Musculoskeletal: Positive for back pain  Negative for arthralgias  Skin: Negative for color change and rash  Neurological: Negative for seizures and syncope  All other systems reviewed and are negative          Objective:    /68   Pulse 84   Temp 99 1 °F (37 3 °C)   Resp 20   Ht 5' 7" (1 702 m)   Wt 65 8 kg (145 lb)   LMP 05/02/2016 (Approximate) Comment: pre-menopause  BMI 22 71 kg/m²   Body mass index is 22 71 kg/m²  Physical Exam  Constitutional:       Appearance: She is well-developed  HENT:      Head: Normocephalic  Eyes:      Pupils: Pupils are equal, round, and reactive to light  Neck:      Musculoskeletal: Normal range of motion  Cardiovascular:      Rate and Rhythm: Normal rate and regular rhythm  Heart sounds: Normal heart sounds  Pulmonary:      Effort: Pulmonary effort is normal       Breath sounds: Normal breath sounds  Abdominal:      General: Bowel sounds are normal       Palpations: Abdomen is soft  Tenderness: There is no abdominal tenderness  Musculoskeletal:      Comments: Paraspinal spasm of the lumbar spine   Skin:     General: Skin is warm  Neurological:      Mental Status: She is alert and oriented to person, place, and time

## 2021-07-08 ENCOUNTER — APPOINTMENT (OUTPATIENT)
Dept: RADIOLOGY | Facility: CLINIC | Age: 51
End: 2021-07-08
Payer: COMMERCIAL

## 2021-07-08 DIAGNOSIS — R52 PAIN: ICD-10-CM

## 2021-07-08 PROCEDURE — 72072 X-RAY EXAM THORAC SPINE 3VWS: CPT

## 2021-07-08 PROCEDURE — 72050 X-RAY EXAM NECK SPINE 4/5VWS: CPT

## 2021-07-08 PROCEDURE — 72110 X-RAY EXAM L-2 SPINE 4/>VWS: CPT

## 2021-07-12 ENCOUNTER — APPOINTMENT (OUTPATIENT)
Dept: LAB | Facility: CLINIC | Age: 51
End: 2021-07-12
Payer: COMMERCIAL

## 2021-07-12 DIAGNOSIS — R52 PAIN: ICD-10-CM

## 2021-07-12 DIAGNOSIS — E55.9 VITAMIN D DEFICIENCY: ICD-10-CM

## 2021-07-12 DIAGNOSIS — Z13.220 SCREENING FOR CHOLESTEROL LEVEL: ICD-10-CM

## 2021-07-12 DIAGNOSIS — Z13.29 SCREENING FOR THYROID DISORDER: ICD-10-CM

## 2021-07-12 DIAGNOSIS — F31.70 BIPOLAR DISORDER IN FULL REMISSION, MOST RECENT EPISODE UNSPECIFIED TYPE (HCC): ICD-10-CM

## 2021-07-12 LAB
25(OH)D3 SERPL-MCNC: 33 NG/ML (ref 30–100)
CHOLEST SERPL-MCNC: 213 MG/DL (ref 50–200)
CRP SERPL QL: <3 MG/L
HDLC SERPL-MCNC: 82 MG/DL
LDLC SERPL CALC-MCNC: 114 MG/DL (ref 0–100)
LITHIUM SERPL-SCNC: 0.8 MMOL/L (ref 0.5–1)
TRIGL SERPL-MCNC: 86 MG/DL
TSH SERPL DL<=0.05 MIU/L-ACNC: 3.78 UIU/ML (ref 0.36–3.74)

## 2021-07-12 PROCEDURE — 80178 ASSAY OF LITHIUM: CPT

## 2021-07-12 PROCEDURE — 80061 LIPID PANEL: CPT

## 2021-07-12 PROCEDURE — 82306 VITAMIN D 25 HYDROXY: CPT

## 2021-07-12 PROCEDURE — 86140 C-REACTIVE PROTEIN: CPT

## 2021-07-12 PROCEDURE — 36415 COLL VENOUS BLD VENIPUNCTURE: CPT

## 2021-07-12 PROCEDURE — 84443 ASSAY THYROID STIM HORMONE: CPT

## 2021-08-04 ENCOUNTER — NURSE TRIAGE (OUTPATIENT)
Dept: OTHER | Facility: OTHER | Age: 51
End: 2021-08-04

## 2021-08-04 NOTE — TELEPHONE ENCOUNTER
Reason for Disposition   Redness or swelling on the cheek, forehead or around the eye and fever    Answer Assessment - Initial Assessment Questions  1  ANTIBIOTIC: "What antibiotic are you receiving?" "How many times per day?"      Augmentin   2  ONSET: "When was the antibiotic started?"      2 weeks ago   3  PAIN: "How bad is the sinus pain?"   (Scale 1-10; mild, moderate or severe)    - MILD (1-3): doesn't interfere with normal activities     - MODERATE (4-7): interferes with normal activities (e g , work or school) or awakens from sleep    - SEVERE (8-10): excruciating pain and patient unable to do any normal activities         7-8 out of 10   4  FEVER: "Do you have a fever?" If so, ask: "What is it, how was it measured, and when did it start?"       Patient feels feverish, unable to obtain the temperature   5   SYMPTOMS: "Are there any other symptoms you're concerned about?" If so, ask: "When did it start?"       Sinus pain in right side of the face, ear ache right side, tingling in forehead    Protocols used: SINUS INFECTION ON ANTIBIOTIC FOLLOW-UP CALL-ADULT-[place]

## 2021-08-04 NOTE — TELEPHONE ENCOUNTER
Regarding: sinus pressure, rt ear/cheek pain, tingling forehead, green/bloody mucus nose, chills feels feverish  ----- Message from Bredna Elliott sent at 8/4/2021 11:02 AM EDT -----  "For over a week I have had bad sinus pressure, pain in right ear and cheek, my forehead tingles, blowing nose with green mucus that is a reddish brown also, chills, feverish    I was seen at Urgent Care over a week ago and was given Augmentin which has not worked "

## 2021-08-06 ENCOUNTER — TELEPHONE (OUTPATIENT)
Dept: OTHER | Facility: OTHER | Age: 51
End: 2021-08-06

## 2021-08-17 ENCOUNTER — OFFICE VISIT (OUTPATIENT)
Dept: FAMILY MEDICINE CLINIC | Facility: CLINIC | Age: 51
End: 2021-08-17
Payer: COMMERCIAL

## 2021-08-17 VITALS
SYSTOLIC BLOOD PRESSURE: 132 MMHG | HEART RATE: 84 BPM | RESPIRATION RATE: 20 BRPM | TEMPERATURE: 98.9 F | HEIGHT: 67 IN | WEIGHT: 143 LBS | BODY MASS INDEX: 22.44 KG/M2 | DIASTOLIC BLOOD PRESSURE: 76 MMHG

## 2021-08-17 DIAGNOSIS — F31.70 BIPOLAR DISORDER IN FULL REMISSION, MOST RECENT EPISODE UNSPECIFIED TYPE (HCC): ICD-10-CM

## 2021-08-17 DIAGNOSIS — R52 PAIN: ICD-10-CM

## 2021-08-17 DIAGNOSIS — M54.50 CHRONIC BILATERAL LOW BACK PAIN WITHOUT SCIATICA: ICD-10-CM

## 2021-08-17 DIAGNOSIS — F41.0 SEVERE ANXIETY WITH PANIC: ICD-10-CM

## 2021-08-17 DIAGNOSIS — J45.40 MODERATE PERSISTENT ASTHMA WITHOUT COMPLICATION: ICD-10-CM

## 2021-08-17 DIAGNOSIS — Z00.00 ANNUAL PHYSICAL EXAM: ICD-10-CM

## 2021-08-17 DIAGNOSIS — Z11.59 NEED FOR HEPATITIS C SCREENING TEST: ICD-10-CM

## 2021-08-17 DIAGNOSIS — Z11.4 SCREENING FOR HIV (HUMAN IMMUNODEFICIENCY VIRUS): ICD-10-CM

## 2021-08-17 DIAGNOSIS — G62.9 NEUROPATHY: Primary | ICD-10-CM

## 2021-08-17 DIAGNOSIS — E55.9 VITAMIN D DEFICIENCY: ICD-10-CM

## 2021-08-17 DIAGNOSIS — G89.29 CHRONIC NECK PAIN: ICD-10-CM

## 2021-08-17 DIAGNOSIS — M54.2 CHRONIC NECK PAIN: ICD-10-CM

## 2021-08-17 DIAGNOSIS — Z13.1 SCREENING FOR DIABETES MELLITUS: ICD-10-CM

## 2021-08-17 DIAGNOSIS — G89.29 CHRONIC BILATERAL LOW BACK PAIN WITHOUT SCIATICA: ICD-10-CM

## 2021-08-17 PROCEDURE — 99396 PREV VISIT EST AGE 40-64: CPT | Performed by: FAMILY MEDICINE

## 2021-08-17 PROCEDURE — 99213 OFFICE O/P EST LOW 20 MIN: CPT | Performed by: FAMILY MEDICINE

## 2021-08-17 RX ORDER — LITHIUM CARBONATE 300 MG/1
900 CAPSULE ORAL
Qty: 90 CAPSULE | Refills: 2 | Status: SHIPPED | OUTPATIENT
Start: 2021-08-17 | End: 2021-12-21 | Stop reason: SDUPTHER

## 2021-08-17 RX ORDER — ALPRAZOLAM 0.5 MG/1
0.5 TABLET ORAL 3 TIMES DAILY PRN
Qty: 90 TABLET | Refills: 2 | Status: SHIPPED | OUTPATIENT
Start: 2021-08-17 | End: 2022-01-24 | Stop reason: SDUPTHER

## 2021-08-17 RX ORDER — PYRIDOXINE HCL (VITAMIN B6) 100 MG
100 TABLET ORAL DAILY
Qty: 30 TABLET | Refills: 5 | Status: SHIPPED | OUTPATIENT
Start: 2021-08-17 | End: 2022-01-24 | Stop reason: SDUPTHER

## 2021-08-17 RX ORDER — ARIPIPRAZOLE 2 MG/1
2 TABLET ORAL DAILY
Qty: 30 TABLET | Refills: 3 | Status: SHIPPED | OUTPATIENT
Start: 2021-08-17 | End: 2022-01-14

## 2021-08-17 NOTE — PATIENT INSTRUCTIONS

## 2021-08-17 NOTE — PROGRESS NOTES
[address] PRIMARY CARE    NAME: Chidi Cutler  AGE: 46 y o  SEX: female  : 1970     DATE: 2021     Assessment and Plan: bipolar disorder with a therapeutic lithium level with depressive symptoms will add Abilify at a low dose of 2 mg per day    Chronic anxiety with panic Xanax is been effective therapy the PDMP has been reviewed no issues found  No evidence of divergence or abuse    Asthma uses Atrovent as rescue inhaler    Femoral cutaneous nerve syndrome with neuropathy will add B6 100 mg daily    Chronic pain of the neck back and right hip will refer to pain management   XR spine lumbar minimum 4 views non injury  Status: Final result   PACS ImagesXR spine cervical complete 4 or 5 vw non injury  Status: Final result   PACS Images     Show images for XR spine cervical complete 4 or 5 vw non injury   Study Result    Narrative & Impression   CERVICAL SPINE     INDICATION:   R52: Pain, unspecified      COMPARISON:  None     VIEWS:  XR SPINE CERVICAL COMPLETE 4 OR 5 VW NON INJURY         FINDINGS:     No fracture       Minimal anterolisthesis of C4 on C5 and C5 on C6      Disc heights are preserved  There are facet joint degenerative changes most prominent C4-5 through C6-7       Mild bilateral osseous neural foraminal narrowing at C4-5 and C5-6      The prevertebral soft tissues are within normal limits        The lung apices are clear      IMPRESSION:     No acute osseous abnormality      Degenerative changes as above         Workstation performed: [id number]      Imaging    XR spine cervical complete 4 or 5 vw non injury (Order: [id number]) - 2021  Result History    XR spine cervical complete 4 or 5 vw non injury (Order #[id number]) on 2021 - Order Result History Report   Order Report    Order Details  Order Questions    Question Answer Comment   Pregnant?  No           Reason for Exam    Dx: Pain Waldo Macleod (ICD-10-CM)] All Reviewers List    Janna Willard DO on 7/19/2021 11:09 AM   Signed by    Signed Date/Time  Phone Pager   Ny [place] 7/18/2021 15:24 [phone]    Exam Information    Status Exam Begun  Exam Ended  Performing Tech   Final [99] 7/08/2021 13:00 7/08/2021 14:49 Pal Flanagan   External Results Report    Open External Results Report    Encounter    View Encounter             Patient Care Timeline    No data selected in time range  Pre-op Summary    Pre-op           Recovery Summary    Recovery             Routing History    None          Show images for XR spine lumbar minimum 4 views non injury   Study Result    Narrative & Impression   LUMBAR SPINE     INDICATION:   R52: Pain, unspecified      COMPARISON:  None     VIEWS:  XR SPINE LUMBAR MINIMUM 4 VIEWS NON INJURY        FINDINGS:     There are 5 non rib bearing lumbar vertebral bodies       There is no evidence of acute fracture or destructive osseous lesion      Mild scoliotic deformity is noted  Alignment is otherwise unremarkable       There are mild endplate and facet joint degenerative changes at L3-4 through L5-S1      The pedicles appear intact      There are atherosclerotic calcifications  Soft tissues are otherwise unremarkable      IMPRESSION:     No acute osseous abnormality        Degenerative changes as described         Workstation performed: [id number]      Imaging    XR spine lumbar minimum 4 views non injury (Order: [id number]) - 7/8/2021  Result History    XR spine lumbar minimum 4 views non injury (Order #[id number]) on 7/18/2021 - Order Result History Report   Order Report    Order Details  Order Questions    Question Answer Comment   Pregnant?  No           Reason for Exam    Dx: Pain [G80 (ICD-10-CM)]   All Reviewers List    Janna Willard DO on 7/19/2021 11:09 AM   Signed by    Signed Date/Time  Phone Pager   Ny [place] 7/18/2021 18:09 [phone]    Exam Information    Status Exam Begun  Exam Ended  Performing Tech Final [99] 7/08/2021 13:00 7/08/2021 14:49 Gabrielajewellcosme Marmolejo   External Results Report    Open External Results Report    Encounter    View Encounter             Patient Care Timeline    No data selected in time range  Pre-op Summary    Pre-op           Recovery Summary    Recovery             Routing History    None       Problem List Items Addressed This Visit     None      Visit Diagnoses     Severe anxiety with panic        Bipolar disorder in full remission, most recent episode unspecified type ([facility] [address] )              Immunizations and preventive care screenings were discussed with patient today  Appropriate education was printed on patient's after visit summary  Counseling:  · Alcohol/drug use: discussed moderation in alcohol intake, the recommendations for healthy alcohol use, and avoidance of illicit drug use  No follow-ups on file  Chief Complaint:     Chief Complaint   Patient presents with    Annual Exam     per insurance     Medication Refill    Pain     head, neck, and back  Patient would like to return to Pain Management for injections  Patient has history with [facility], but patient is now living in [facility]     patient would like to return to ENT, chronic sinus infections, facial swelling, and ear ache with dizziness      History of Present Illness:     Adult Annual Physical   Patient here for a comprehensive physical exam  The patient reports no problems  Diet and Physical Activity  · Diet/Nutrition: well balanced diet  · Exercise: no formal exercise  Depression Screening  PHQ-9 Depression Screening    PHQ-9:   Frequency of the following problems over the past two weeks:      Little interest or pleasure in doing things: 0 - not at all  Feeling down, depressed, or hopeless: 0 - not at all  PHQ-2 Score: 0       General Health  · Sleep: sleeps poorly  · Hearing: normal - bilateral   · Vision: no vision problems  · Dental: regular dental visits  /GYN Health  · Patient is: postmenopausal  · Last menstrual periodyrs  · Contraceptive method: oral contraceptives, menopause  Review of Systems:     Review of Systems   Constitutional: Negative for chills and fever  HENT: Negative for ear pain and sore throat  Eyes: Negative for pain and visual disturbance  Respiratory: Negative for cough and shortness of breath  Cardiovascular: Negative for chest pain and palpitations  Gastrointestinal: Negative for abdominal pain and vomiting  Genitourinary: Negative for dysuria and hematuria  Musculoskeletal: Positive for back pain and neck pain  Negative for arthralgias  Severe neck and lower back pain pain is 6/7 out of 10   Skin: Negative for color change and rash  Neurological: Negative for seizures and syncope  Psychiatric/Behavioral: Positive for dysphoric mood  Has been having mood swings and depression states that she has been on Abilify in the past and it made her feel normal   All other systems reviewed and are negative       Past Medical History:     Past Medical History:   Diagnosis Date    ADD (attention deficit disorder)     Colitis, ulcerative ([address] )     Depression       Past Surgical History:     Past Surgical History:   Procedure Laterality Date    ENDOMETRIAL ABLATION      TUBAL LIGATION        Social History:     Social History     Socioeconomic History    Marital status:      Spouse name: None    Number of children: None    Years of education: None    Highest education level: None   Occupational History    None   Tobacco Use    Smoking status: Former Smoker     Quit date:      Years since quittin 6    Smokeless tobacco: Never Used   Substance and Sexual Activity    Alcohol use: No    Drug use: Never    Sexual activity: None   Other Topics Concern    None   Social History Narrative    None     Social Determinants of Health     Financial Resource Strain:     Difficulty of Paying Living Expenses:    Food Insecurity:     Worried About Running Out of Food in the Last Year:     [address] in the Last Year:    Transportation Needs:     Lack of Transportation (Medical):  Lack of Transportation (Non-Medical):    Physical Activity:     Days of Exercise per Week:     Minutes of Exercise per Session:    Stress:     Feeling of Stress :    Social Connections:     Frequency of Communication with Friends and Family:     Frequency of Social Gatherings with Friends and Family:     Attends [employer] Services:     Active Member of Clubs or Organizations:     Attends Club or Organization Meetings:     Marital Status:    Intimate Partner Violence:     Fear of Current or Ex-Partner:     Emotionally Abused:     Physically Abused:     Sexually Abused:       Family History:     No family history on file  Current Medications:     Current Outpatient Medications   Medication Sig Dispense Refill    ALPRAZolam (XANAX) 0 5 mg tablet Take 1 tablet (0 5 mg total) by mouth 3 (three) times a day as needed for anxiety 90 tablet 2    cloNIDine (CATAPRES) 0 2 mg tablet Take 1 tablet (0 2 mg total) by mouth daily 90 tablet 1    Conj Estrog-Medroxyprogest Ace (PREMPRO PO) Take 1 tablet by mouth daily   hydrocortisone 1 % lotion Apply topically 2 (two) times a day 118 mL 0    ipratropium (ATROVENT HFA) 17 mcg/act inhaler Inhale 2 puffs 4 (four) times a day 12 9 g 0    lithium carbonate 300 mg capsule Take 3 capsules (900 mg total) by mouth daily at bedtime 90 capsule 2    ondansetron (ZOFRAN) 4 mg tablet Take 1 tablet (4 mg total) by mouth every 8 (eight) hours as needed for nausea or vomiting 20 tablet 0     No current facility-administered medications for this visit  Allergies:      Allergies   Allergen Reactions    Dust Mite Extract Other (See Comments)     Environmental Allergy      Physical Exam:     /76   Pulse 84   Temp 98 9 °F (37 2 °C)   Resp 20   Ht 5' 7" (1 702 m)   Wt 64 9 kg (143 lb)   LMP 05/02/2016 (Approximate) Comment: pre-menopause  BMI 22 40 kg/m²     Physical Exam  Vitals and nursing note reviewed  Constitutional:       General: She is not in acute distress  Appearance: She is well-developed  HENT:      Head: Normocephalic and atraumatic  Eyes:      Conjunctiva/sclera: Conjunctivae normal    Cardiovascular:      Rate and Rhythm: Normal rate and regular rhythm  Heart sounds: No murmur heard  Pulmonary:      Effort: Pulmonary effort is normal  No respiratory distress  Breath sounds: Normal breath sounds  Abdominal:      Palpations: Abdomen is soft  Tenderness: There is no abdominal tenderness  Musculoskeletal:      Cervical back: Neck supple  Skin:     General: Skin is warm and dry  Neurological:      Mental Status: She is alert            Watt Simone [facility] PRIMARY CARE

## 2021-08-24 ENCOUNTER — TELEPHONE (OUTPATIENT)
Dept: FAMILY MEDICINE CLINIC | Facility: CLINIC | Age: 51
End: 2021-08-24

## 2021-08-24 DIAGNOSIS — J32.9 CHRONIC SINUSITIS, UNSPECIFIED LOCATION: Primary | ICD-10-CM

## 2021-08-24 NOTE — TELEPHONE ENCOUNTER
Patient states you both briefly talked about a ref to ENT for her chronic sinus and ear problem - advised I will put into system and if she does not hear from anyone to please contact the office

## 2021-09-07 ENCOUNTER — OFFICE VISIT (OUTPATIENT)
Dept: FAMILY MEDICINE CLINIC | Facility: CLINIC | Age: 51
End: 2021-09-07
Payer: COMMERCIAL

## 2021-09-07 VITALS
TEMPERATURE: 98.9 F | BODY MASS INDEX: 22.29 KG/M2 | HEART RATE: 84 BPM | SYSTOLIC BLOOD PRESSURE: 132 MMHG | RESPIRATION RATE: 20 BRPM | HEIGHT: 67 IN | WEIGHT: 142 LBS | DIASTOLIC BLOOD PRESSURE: 74 MMHG

## 2021-09-07 DIAGNOSIS — J32.0 CHRONIC MAXILLARY SINUSITIS: Primary | ICD-10-CM

## 2021-09-07 DIAGNOSIS — R11.0 NAUSEA: ICD-10-CM

## 2021-09-07 DIAGNOSIS — J45.40 MODERATE PERSISTENT ASTHMA WITHOUT COMPLICATION: ICD-10-CM

## 2021-09-07 DIAGNOSIS — G62.9 NEUROPATHY: ICD-10-CM

## 2021-09-07 DIAGNOSIS — R06.02 SHORTNESS OF BREATH: ICD-10-CM

## 2021-09-07 DIAGNOSIS — L30.9 DERMATITIS: ICD-10-CM

## 2021-09-07 DIAGNOSIS — T88.7XXA SIDE EFFECT OF DRUG: ICD-10-CM

## 2021-09-07 PROCEDURE — 99214 OFFICE O/P EST MOD 30 MIN: CPT | Performed by: FAMILY MEDICINE

## 2021-09-07 RX ORDER — GABAPENTIN 100 MG/1
100 CAPSULE ORAL 3 TIMES DAILY
COMMUNITY
End: 2021-09-07 | Stop reason: SDUPTHER

## 2021-09-07 RX ORDER — OMEPRAZOLE 20 MG/1
20 CAPSULE, DELAYED RELEASE ORAL
Qty: 90 CAPSULE | Refills: 3 | Status: SHIPPED | OUTPATIENT
Start: 2021-09-07 | End: 2022-01-24 | Stop reason: SDUPTHER

## 2021-09-07 RX ORDER — BENZTROPINE MESYLATE 0.5 MG/1
0.5 TABLET ORAL 2 TIMES DAILY
Qty: 60 TABLET | Refills: 5 | Status: SHIPPED | OUTPATIENT
Start: 2021-09-07 | End: 2022-01-24 | Stop reason: SDUPTHER

## 2021-09-07 RX ORDER — GABAPENTIN 100 MG/1
100 CAPSULE ORAL 3 TIMES DAILY
Qty: 90 CAPSULE | Refills: 5 | Status: SHIPPED | OUTPATIENT
Start: 2021-09-07 | End: 2022-01-24 | Stop reason: SDUPTHER

## 2021-09-07 NOTE — PROGRESS NOTES
Assessment/Plan:Chronic maxillary sinusitis on the right side will refer to ENT    Moderate persistent asthma with shortness of breath the patient used her inhaler twice this week will be obtaining a chest x-ray  A pulmonary function test has also been ordered but not yet completed    Betzaida aural dermatitis will prescribe Bactroban    Nausea had been treated with omeprazole in the past will provide omeprazole again if the nausea persists will do further studies  Patient has had twitching on Abilify but Abilify has improved her quality of life will add Cogentin to the Abilify  Bilateral lower extremity neuropathy will supply gabapentin 100 mg t i d  Chronic neck and back pain the patient has been referred to pain management    Problem List Items Addressed This Visit     None      Visit Diagnoses     Nausea    -  Primary    Relevant Medications    omeprazole (PriLOSEC) 20 mg delayed release capsule    Shortness of breath        Relevant Orders    XR chest pa & lateral    Chronic maxillary sinusitis        Relevant Orders    Ambulatory Referral to Otolaryngology    Dermatitis        Relevant Medications    mupirocin (BACTROBAN) 2 % nasal ointment    Side effect of drug        Relevant Medications    benztropine (COGENTIN) 0 5 mg tablet           Diagnoses and all orders for this visit:    Nausea  -     omeprazole (PriLOSEC) 20 mg delayed release capsule; Take 1 capsule (20 mg total) by mouth daily before breakfast    Shortness of breath  -     XR chest pa & lateral; Future    Chronic maxillary sinusitis  -     Ambulatory Referral to Otolaryngology; Future    Dermatitis  -     mupirocin (BACTROBAN) 2 % nasal ointment; into each nostril 2 (two) times a day    Side effect of drug  -     benztropine (COGENTIN) 0 5 mg tablet; Take 1 tablet (0 5 mg total) by mouth 2 (two) times a day    Other orders  -     gabapentin (NEURONTIN) 100 mg capsule;  Take 100 mg by mouth 3 (three) times a day (Patient not taking: Reported on 9/7/2021)        No problem-specific Assessment & Plan notes found for this encounter  PHQ-9 Depression Screening    PHQ-9:   Frequency of the following problems over the past two weeks: Body mass index is 22 24 kg/m²  BMI Counseling: Body mass index is 22 24 kg/m²  The BMI     Subjective:      Patient ID: Jesse Carlson is a 46 y o  female  Patient presents for checkup to go over her chronic maxillary sinusitis on the right side her back pain and x-rays  The following portions of the patient's history were reviewed and updated as appropriate:   She has a past medical history of ADD (attention deficit disorder), Colitis, ulcerative ([address] ), and Depression  ,  does not have a problem list on file  ,   has a past surgical history that includes Tubal ligation and Endometrial ablation  ,  family history is not on file  ,   reports that she quit smoking about 7 years ago  She has never used smokeless tobacco  She reports that she does not drink alcohol and does not use drugs  ,  is allergic to dust mite extract     Current Outpatient Medications   Medication Sig Dispense Refill    ALPRAZolam (XANAX) 0 5 mg tablet Take 1 tablet (0 5 mg total) by mouth 3 (three) times a day as needed for anxiety 90 tablet 2    ARIPiprazole (ABILIFY) 2 mg tablet Take 1 tablet (2 mg total) by mouth daily 30 tablet 3    benztropine (COGENTIN) 0 5 mg tablet Take 1 tablet (0 5 mg total) by mouth 2 (two) times a day 60 tablet 5    cloNIDine (CATAPRES) 0 2 mg tablet Take 1 tablet (0 2 mg total) by mouth daily 90 tablet 1    Conj Estrog-Medroxyprogest Ace (PREMPRO PO) Take 1 tablet by mouth daily        gabapentin (NEURONTIN) 100 mg capsule Take 100 mg by mouth 3 (three) times a day (Patient not taking: Reported on 9/7/2021)      hydrocortisone 1 % lotion Apply topically 2 (two) times a day 118 mL 0    ipratropium (ATROVENT HFA) 17 mcg/act inhaler Inhale 2 puffs 4 (four) times a day 12 9 g 0    lithium carbonate 300 mg capsule Take 3 capsules (900 mg total) by mouth daily at bedtime 90 capsule 2    mupirocin (BACTROBAN) 2 % nasal ointment into each nostril 2 (two) times a day 10 g 0    omeprazole (PriLOSEC) 20 mg delayed release capsule Take 1 capsule (20 mg total) by mouth daily before breakfast 90 capsule 3    ondansetron (ZOFRAN) 4 mg tablet Take 1 tablet (4 mg total) by mouth every 8 (eight) hours as needed for nausea or vomiting 20 tablet 0    Pyridoxine HCl (vitamin B-6) 100 MG TABS Take 1 tablet (100 mg total) by mouth daily 30 tablet 5     No current facility-administered medications for this visit  Review of Systems   Constitutional: Negative for chills and fever  HENT: Negative for ear pain and sore throat  Eyes: Negative for pain and visual disturbance  Respiratory: Positive for shortness of breath and wheezing  Negative for cough  Cardiovascular: Negative for chest pain and palpitations  Gastrointestinal: Negative for abdominal pain and vomiting  Genitourinary: Negative for dysuria and hematuria  Musculoskeletal: Positive for arthralgias, back pain, myalgias, neck pain and neck stiffness  Skin: Negative for color change and rash  Neurological: Negative for seizures and syncope  All other systems reviewed and are negative  Objective:    /74   Pulse 84   Temp 98 9 °F (37 2 °C)   Resp 20   Ht 5' 7" (1 702 m)   Wt 64 4 kg (142 lb)   LMP 05/02/2016 (Approximate) Comment: pre-menopause  BMI 22 24 kg/m²   Body mass index is 22 24 kg/m²  Physical Exam  Constitutional:       Appearance: She is well-developed  HENT:      Head: Normocephalic  Right Ear: Tenderness present  Nose:      Right Sinus: Maxillary sinus tenderness present  Eyes:      Pupils: Pupils are equal, round, and reactive to light  Cardiovascular:      Rate and Rhythm: Normal rate and regular rhythm  Heart sounds: Normal heart sounds     Pulmonary:      Effort: Pulmonary effort is normal       Breath sounds: Normal breath sounds  Abdominal:      General: Bowel sounds are normal       Palpations: Abdomen is soft  Tenderness: There is no abdominal tenderness  Musculoskeletal:      Cervical back: Normal range of motion  Skin:     General: Skin is warm  Neurological:      Mental Status: She is alert and oriented to person, place, and time

## 2021-09-15 ENCOUNTER — TELEPHONE (OUTPATIENT)
Dept: OTHER | Facility: OTHER | Age: 51
End: 2021-09-15

## 2021-09-15 NOTE — TELEPHONE ENCOUNTER
Jethro Garrett from [facility] is calling to request documentation of her mental health medications and office notes for case management purposes to be faxed to [phone]      If you have any questions please call Jethro Garrett at [phone]

## 2021-09-16 NOTE — TELEPHONE ENCOUNTER
Will send last office note and medication list - did send the request to [facility]

## 2021-11-27 DIAGNOSIS — F31.70 BIPOLAR DISORDER IN FULL REMISSION, MOST RECENT EPISODE UNSPECIFIED TYPE (HCC): ICD-10-CM

## 2021-11-29 RX ORDER — CLONIDINE HYDROCHLORIDE 0.2 MG/1
TABLET ORAL
Qty: 90 TABLET | Refills: 0 | Status: SHIPPED | OUTPATIENT
Start: 2021-11-29 | End: 2022-01-24 | Stop reason: SDUPTHER

## 2021-12-12 DIAGNOSIS — F31.70 BIPOLAR DISORDER IN FULL REMISSION, MOST RECENT EPISODE UNSPECIFIED TYPE (HCC): ICD-10-CM

## 2021-12-12 DIAGNOSIS — F41.0 SEVERE ANXIETY WITH PANIC: ICD-10-CM

## 2021-12-13 NOTE — TELEPHONE ENCOUNTER
Patient needs an appointment for refills  If needed, will send in medications to reach her appointment date

## 2021-12-21 DIAGNOSIS — F31.70 BIPOLAR DISORDER IN FULL REMISSION, MOST RECENT EPISODE UNSPECIFIED TYPE (HCC): ICD-10-CM

## 2021-12-21 RX ORDER — LITHIUM CARBONATE 300 MG/1
900 CAPSULE ORAL
Qty: 90 CAPSULE | Refills: 0 | Status: SHIPPED | OUTPATIENT
Start: 2021-12-21 | End: 2022-01-14 | Stop reason: SDUPTHER

## 2022-01-04 ENCOUNTER — TELEPHONE (OUTPATIENT)
Dept: FAMILY MEDICINE CLINIC | Facility: CLINIC | Age: 52
End: 2022-01-04

## 2022-01-04 DIAGNOSIS — R09.81 HEAD CONGESTION: ICD-10-CM

## 2022-01-04 DIAGNOSIS — R53.83 FATIGUE, UNSPECIFIED TYPE: ICD-10-CM

## 2022-01-04 DIAGNOSIS — H92.09 EAR ACHE: ICD-10-CM

## 2022-01-04 DIAGNOSIS — J02.9 SORE THROAT: ICD-10-CM

## 2022-01-04 DIAGNOSIS — R19.7 DIARRHEA, UNSPECIFIED TYPE: ICD-10-CM

## 2022-01-04 DIAGNOSIS — R11.0 NAUSEA: ICD-10-CM

## 2022-01-04 DIAGNOSIS — R68.83 CHILLS: ICD-10-CM

## 2022-01-04 DIAGNOSIS — Z20.822 EXPOSURE TO COVID-19 VIRUS: Primary | ICD-10-CM

## 2022-01-04 NOTE — TELEPHONE ENCOUNTER
Patient was exposed to someone on Friday that tested positive for covid - c/o chills, fatigue, sore throat, head congestion, ear ache, nausea, and diarrhea - symptoms started Sunday - unknown if has fever - no loss of taste or smell - patient would like testing Rx faxed to [phone] - [address] [place] in [place]

## 2022-01-06 ENCOUNTER — TELEPHONE (OUTPATIENT)
Dept: FAMILY MEDICINE CLINIC | Facility: CLINIC | Age: 52
End: 2022-01-06

## 2022-01-06 NOTE — TELEPHONE ENCOUNTER
Patient requested an exemption to her utility service [employer]ric Lanre [id number] so her utilities do not get shut off due to medical reasons, I did speak with the [facility] as well as advised the patient she has no exemptions as in insulin in fridge, electrical oxygen etc

## 2022-01-14 DIAGNOSIS — F31.70 BIPOLAR DISORDER IN FULL REMISSION, MOST RECENT EPISODE UNSPECIFIED TYPE (HCC): ICD-10-CM

## 2022-01-14 RX ORDER — LITHIUM CARBONATE 300 MG/1
900 CAPSULE ORAL
Qty: 90 CAPSULE | Refills: 0 | Status: SHIPPED | OUTPATIENT
Start: 2022-01-14 | End: 2022-01-24 | Stop reason: SDUPTHER

## 2022-01-14 RX ORDER — ARIPIPRAZOLE 2 MG/1
TABLET ORAL
Qty: 30 TABLET | Refills: 0 | Status: SHIPPED | OUTPATIENT
Start: 2022-01-14 | End: 2022-01-24 | Stop reason: SDUPTHER

## 2022-01-14 RX ORDER — LITHIUM CARBONATE 300 MG/1
CAPSULE ORAL
Qty: 90 CAPSULE | Refills: 0 | OUTPATIENT
Start: 2022-01-14

## 2022-01-14 RX ORDER — LITHIUM CARBONATE 300 MG/1
CAPSULE ORAL
Qty: 90 CAPSULE | Refills: 2 | OUTPATIENT
Start: 2022-01-14

## 2022-01-14 NOTE — TELEPHONE ENCOUNTER
Patient called and spoke with   Rescheduled her appointment until 01/24/2022 due to the pending snow storm  Will send partial Rx to Pharm but will need a full refill at Appointment

## 2022-01-24 ENCOUNTER — OFFICE VISIT (OUTPATIENT)
Dept: FAMILY MEDICINE CLINIC | Facility: CLINIC | Age: 52
End: 2022-01-24
Payer: COMMERCIAL

## 2022-01-24 VITALS
WEIGHT: 136 LBS | HEIGHT: 67 IN | BODY MASS INDEX: 21.35 KG/M2 | DIASTOLIC BLOOD PRESSURE: 80 MMHG | HEART RATE: 84 BPM | TEMPERATURE: 99 F | RESPIRATION RATE: 20 BRPM | SYSTOLIC BLOOD PRESSURE: 126 MMHG

## 2022-01-24 DIAGNOSIS — G62.9 NEUROPATHY: ICD-10-CM

## 2022-01-24 DIAGNOSIS — Z13.220 SCREENING FOR CHOLESTEROL LEVEL: ICD-10-CM

## 2022-01-24 DIAGNOSIS — E55.9 VITAMIN D DEFICIENCY: ICD-10-CM

## 2022-01-24 DIAGNOSIS — R11.0 NAUSEA: ICD-10-CM

## 2022-01-24 DIAGNOSIS — L30.9 DERMATITIS: ICD-10-CM

## 2022-01-24 DIAGNOSIS — F31.70 BIPOLAR DISORDER IN FULL REMISSION, MOST RECENT EPISODE UNSPECIFIED TYPE (HCC): ICD-10-CM

## 2022-01-24 DIAGNOSIS — F41.0 SEVERE ANXIETY WITH PANIC: Primary | ICD-10-CM

## 2022-01-24 DIAGNOSIS — J45.40 MODERATE PERSISTENT ASTHMA WITHOUT COMPLICATION: ICD-10-CM

## 2022-01-24 DIAGNOSIS — T88.7XXA SIDE EFFECT OF DRUG: ICD-10-CM

## 2022-01-24 PROCEDURE — 99214 OFFICE O/P EST MOD 30 MIN: CPT | Performed by: FAMILY MEDICINE

## 2022-01-24 RX ORDER — LITHIUM CARBONATE 300 MG/1
900 CAPSULE ORAL
Qty: 90 CAPSULE | Refills: 2 | Status: SHIPPED | OUTPATIENT
Start: 2022-01-24 | End: 2022-05-16

## 2022-01-24 RX ORDER — BENZTROPINE MESYLATE 0.5 MG/1
0.5 TABLET ORAL 2 TIMES DAILY
Qty: 60 TABLET | Refills: 5 | Status: SHIPPED | OUTPATIENT
Start: 2022-01-24 | End: 2022-07-08 | Stop reason: SDUPTHER

## 2022-01-24 RX ORDER — OMEPRAZOLE 20 MG/1
20 CAPSULE, DELAYED RELEASE ORAL
Qty: 90 CAPSULE | Refills: 2 | Status: SHIPPED | OUTPATIENT
Start: 2022-01-24

## 2022-01-24 RX ORDER — ONDANSETRON 4 MG/1
4 TABLET, FILM COATED ORAL EVERY 8 HOURS PRN
Qty: 20 TABLET | Refills: 0 | Status: SHIPPED | OUTPATIENT
Start: 2022-01-24 | End: 2022-04-29

## 2022-01-24 RX ORDER — IPRATROPIUM BROMIDE 17 UG/1
1 AEROSOL, METERED RESPIRATORY (INHALATION) 4 TIMES DAILY
Qty: 12.9 G | Refills: 3 | Status: SHIPPED | OUTPATIENT
Start: 2022-01-24

## 2022-01-24 RX ORDER — CLONIDINE HYDROCHLORIDE 0.2 MG/1
0.2 TABLET ORAL DAILY
Qty: 90 TABLET | Refills: 1 | Status: SHIPPED | OUTPATIENT
Start: 2022-01-24 | End: 2022-07-08 | Stop reason: SDUPTHER

## 2022-01-24 RX ORDER — PYRIDOXINE HCL (VITAMIN B6) 100 MG
100 TABLET ORAL DAILY
Qty: 30 TABLET | Refills: 5 | Status: SHIPPED | OUTPATIENT
Start: 2022-01-24

## 2022-01-24 RX ORDER — ARIPIPRAZOLE 2 MG/1
2 TABLET ORAL DAILY
Qty: 30 TABLET | Refills: 2 | Status: SHIPPED | OUTPATIENT
Start: 2022-01-24 | End: 2022-04-26 | Stop reason: SDUPTHER

## 2022-01-24 RX ORDER — GABAPENTIN 100 MG/1
100 CAPSULE ORAL 3 TIMES DAILY
Qty: 90 CAPSULE | Refills: 5 | Status: SHIPPED | OUTPATIENT
Start: 2022-01-24 | End: 2022-04-26 | Stop reason: SDUPTHER

## 2022-01-24 RX ORDER — ALPRAZOLAM 0.5 MG/1
0.5 TABLET ORAL 3 TIMES DAILY PRN
Qty: 90 TABLET | Refills: 2 | Status: SHIPPED | OUTPATIENT
Start: 2022-01-24 | End: 2022-04-26 | Stop reason: SDUPTHER

## 2022-01-24 NOTE — PROGRESS NOTES
Assessment/Plan:  Bipolar disorder with severe anxiety panic and depression are adequately managed on the current regimen  Lithium Abilify and Catapres has a bipolar disorder in full remission  The patient is employed PDMP has been reviewed no issues found no evidence of diversion or abuse  Xanax improves function  A lithium level will be ordered    Moderate persistent asthma without complication Atrovent improves function uses the Atrovent 1-2 times per week    Neuropathy symptoms are minimized with gabapentin 100 t i d  And B6 100 mg daily  Perioral dermatitis is treated with Bactroban    Occasional nausea is relieved with Zofran    GERD without esophagitis Prilosec minimizes symptoms    Body movement disorder is improved with Cogentin 0 5 mg      Problem List Items Addressed This Visit     None      Visit Diagnoses     Severe anxiety with panic        Moderate persistent asthma without complication        Bipolar disorder in full remission, most recent episode unspecified type ([address] )        Neuropathy        Nausea        Dermatitis        Side effect of drug               Diagnoses and all orders for this visit:    Severe anxiety with panic    Moderate persistent asthma without complication    Bipolar disorder in full remission, most recent episode unspecified type ([address] )    Neuropathy    Nausea    Dermatitis    Side effect of drug        No problem-specific Assessment & Plan notes found for this encounter  PHQ-2/9 Depression Screening            Body mass index is 21 3 kg/m²  BMI Counseling: Body mass index is 21 3 kg/m²  The BMI     Subjective:      Patient ID: Mason Rawls is a 46 y o  female  Patient presents for four-month checkup      The following portions of the patient's history were reviewed and updated as appropriate:   She has a past medical history of ADD (attention deficit disorder), Colitis, ulcerative ([address] ), and Depression  ,  does not have a problem list on file  ,   has a past surgical history that includes Tubal ligation and Endometrial ablation  ,  family history is not on file  ,   reports that she quit smoking about 8 years ago  She has never used smokeless tobacco  She reports that she does not drink alcohol and does not use drugs  ,  is allergic to dust mite extract     Current Outpatient Medications   Medication Sig Dispense Refill    ALPRAZolam (XANAX) 0 5 mg tablet Take 1 tablet (0 5 mg total) by mouth 3 (three) times a day as needed for anxiety 90 tablet 2    ARIPiprazole (ABILIFY) 2 mg tablet take 1 tablet by mouth once daily 30 tablet 0    Atrovent HFA 17 MCG/ACT inhaler inhale 2 puffs by mouth and INTO THE LUNGS four times a day 12 9 g 3    benztropine (COGENTIN) 0 5 mg tablet Take 1 tablet (0 5 mg total) by mouth 2 (two) times a day 60 tablet 5    cloNIDine (CATAPRES) 0 2 mg tablet take 1 tablet by mouth once daily 90 tablet 0    Conj Estrog-Medroxyprogest Ace (PREMPRO PO) Take 1 tablet by mouth daily   gabapentin (NEURONTIN) 100 mg capsule Take 1 capsule (100 mg total) by mouth 3 (three) times a day 90 capsule 5    hydrocortisone 1 % lotion Apply topically 2 (two) times a day 118 mL 0    lithium carbonate 300 mg capsule Take 3 capsules (900 mg total) by mouth daily at bedtime 90 capsule 0    mupirocin (BACTROBAN) 2 % nasal ointment into each nostril 2 (two) times a day 10 g 0    omeprazole (PriLOSEC) 20 mg delayed release capsule Take 1 capsule (20 mg total) by mouth daily before breakfast 90 capsule 3    ondansetron (ZOFRAN) 4 mg tablet Take 1 tablet (4 mg total) by mouth every 8 (eight) hours as needed for nausea or vomiting 20 tablet 0    Pyridoxine HCl (vitamin B-6) 100 MG TABS Take 1 tablet (100 mg total) by mouth daily 30 tablet 5     No current facility-administered medications for this visit  Review of Systems   Constitutional: Negative for chills and fever  HENT: Negative for ear pain and sore throat      Eyes: Negative for pain and visual disturbance  Respiratory: Negative for cough and shortness of breath  Cardiovascular: Negative for chest pain and palpitations  Gastrointestinal: Negative for abdominal pain and vomiting  Genitourinary: Negative for dysuria and hematuria  Musculoskeletal: Negative for arthralgias and back pain  Skin: Negative for color change and rash  Neurological: Negative for seizures and syncope  Psychiatric/Behavioral:        Anxiety and depressive symptoms have improved with current regimen body movement disorder has improved with the addition of Cogentin  Severe anxiety and panic is controlled present   All other systems reviewed and are negative  Objective:    /80   Pulse 84   Temp 99 °F (37 2 °C)   Resp 20   Ht 5' 7" (1 702 m)   Wt 61 7 kg (136 lb)   LMP 05/02/2016 (Approximate) Comment: pre-menopause  BMI 21 30 kg/m²   Body mass index is 21 3 kg/m²  Physical Exam  Constitutional:       Appearance: She is well-developed  HENT:      Head: Normocephalic  Eyes:      Pupils: Pupils are equal, round, and reactive to light  Cardiovascular:      Rate and Rhythm: Normal rate and regular rhythm  Heart sounds: Normal heart sounds  Pulmonary:      Effort: Pulmonary effort is normal       Breath sounds: Normal breath sounds  Abdominal:      General: Bowel sounds are normal       Palpations: Abdomen is soft  Tenderness: There is no abdominal tenderness  Musculoskeletal:      Cervical back: Normal range of motion  Skin:     General: Skin is warm  Neurological:      Mental Status: She is alert and oriented to person, place, and time

## 2022-04-25 ENCOUNTER — APPOINTMENT (OUTPATIENT)
Dept: LAB | Facility: MEDICAL CENTER | Age: 52
End: 2022-04-25
Payer: COMMERCIAL

## 2022-04-25 ENCOUNTER — APPOINTMENT (OUTPATIENT)
Dept: RADIOLOGY | Facility: MEDICAL CENTER | Age: 52
End: 2022-04-25
Payer: COMMERCIAL

## 2022-04-25 DIAGNOSIS — G62.9 NEUROPATHY: ICD-10-CM

## 2022-04-25 DIAGNOSIS — Z00.00 ANNUAL PHYSICAL EXAM: ICD-10-CM

## 2022-04-25 DIAGNOSIS — R06.02 SHORTNESS OF BREATH: ICD-10-CM

## 2022-04-25 DIAGNOSIS — J45.40 MODERATE PERSISTENT ASTHMA WITHOUT COMPLICATION: ICD-10-CM

## 2022-04-25 DIAGNOSIS — F31.70 BIPOLAR DISORDER IN FULL REMISSION, MOST RECENT EPISODE UNSPECIFIED TYPE (HCC): ICD-10-CM

## 2022-04-25 DIAGNOSIS — Z11.4 SCREENING FOR HIV (HUMAN IMMUNODEFICIENCY VIRUS): ICD-10-CM

## 2022-04-25 DIAGNOSIS — Z11.59 NEED FOR HEPATITIS C SCREENING TEST: ICD-10-CM

## 2022-04-25 DIAGNOSIS — E55.9 VITAMIN D DEFICIENCY: ICD-10-CM

## 2022-04-25 LAB
25(OH)D3 SERPL-MCNC: 44.9 NG/ML (ref 30–100)
ALBUMIN SERPL BCP-MCNC: 3.7 G/DL (ref 3.5–5)
ALP SERPL-CCNC: 41 U/L (ref 46–116)
ALT SERPL W P-5'-P-CCNC: 29 U/L (ref 12–78)
ANION GAP SERPL CALCULATED.3IONS-SCNC: 2 MMOL/L (ref 4–13)
AST SERPL W P-5'-P-CCNC: 21 U/L (ref 5–45)
BASOPHILS # BLD AUTO: 0.04 THOUSANDS/ΜL (ref 0–0.1)
BASOPHILS NFR BLD AUTO: 0 % (ref 0–1)
BILIRUB SERPL-MCNC: 0.38 MG/DL (ref 0.2–1)
BUN SERPL-MCNC: 15 MG/DL (ref 5–25)
CALCIUM SERPL-MCNC: 9.4 MG/DL (ref 8.3–10.1)
CHLORIDE SERPL-SCNC: 106 MMOL/L (ref 100–108)
CO2 SERPL-SCNC: 29 MMOL/L (ref 21–32)
CREAT SERPL-MCNC: 0.87 MG/DL (ref 0.6–1.3)
EOSINOPHIL # BLD AUTO: 0.1 THOUSAND/ΜL (ref 0–0.61)
EOSINOPHIL NFR BLD AUTO: 1 % (ref 0–6)
ERYTHROCYTE [DISTWIDTH] IN BLOOD BY AUTOMATED COUNT: 12.4 % (ref 11.6–15.1)
GFR SERPL CREATININE-BSD FRML MDRD: 75 ML/MIN/1.73SQ M
GLUCOSE SERPL-MCNC: 101 MG/DL (ref 65–140)
HBV CORE AB SER QL: NORMAL
HBV SURFACE AB SER-ACNC: <3.1 MIU/ML
HBV SURFACE AG SER QL: NORMAL
HCT VFR BLD AUTO: 39.3 % (ref 34.8–46.1)
HCV AB SER QL: NORMAL
HGB BLD-MCNC: 12.4 G/DL (ref 11.5–15.4)
IMM GRANULOCYTES # BLD AUTO: 0.04 THOUSAND/UL (ref 0–0.2)
IMM GRANULOCYTES NFR BLD AUTO: 0 % (ref 0–2)
LITHIUM SERPL-SCNC: 0.5 MMOL/L (ref 0.5–1)
LYMPHOCYTES # BLD AUTO: 4.52 THOUSANDS/ΜL (ref 0.6–4.47)
LYMPHOCYTES NFR BLD AUTO: 36 % (ref 14–44)
MCH RBC QN AUTO: 30.7 PG (ref 26.8–34.3)
MCHC RBC AUTO-ENTMCNC: 31.6 G/DL (ref 31.4–37.4)
MCV RBC AUTO: 97 FL (ref 82–98)
MONOCYTES # BLD AUTO: 0.59 THOUSAND/ΜL (ref 0.17–1.22)
MONOCYTES NFR BLD AUTO: 5 % (ref 4–12)
NEUTROPHILS # BLD AUTO: 7.26 THOUSANDS/ΜL (ref 1.85–7.62)
NEUTS SEG NFR BLD AUTO: 58 % (ref 43–75)
NRBC BLD AUTO-RTO: 0 /100 WBCS
PLATELET # BLD AUTO: 356 THOUSANDS/UL (ref 149–390)
PMV BLD AUTO: 10.4 FL (ref 8.9–12.7)
POTASSIUM SERPL-SCNC: 3.7 MMOL/L (ref 3.5–5.3)
PROT SERPL-MCNC: 7 G/DL (ref 6.4–8.2)
RBC # BLD AUTO: 4.04 MILLION/UL (ref 3.81–5.12)
SODIUM SERPL-SCNC: 137 MMOL/L (ref 136–145)
TSH SERPL DL<=0.05 MIU/L-ACNC: 7.94 UIU/ML (ref 0.45–4.5)
WBC # BLD AUTO: 12.55 THOUSAND/UL (ref 4.31–10.16)

## 2022-04-25 PROCEDURE — 85025 COMPLETE CBC W/AUTO DIFF WBC: CPT

## 2022-04-25 PROCEDURE — 80178 ASSAY OF LITHIUM: CPT

## 2022-04-25 PROCEDURE — 86704 HEP B CORE ANTIBODY TOTAL: CPT

## 2022-04-25 PROCEDURE — 87340 HEPATITIS B SURFACE AG IA: CPT

## 2022-04-25 PROCEDURE — 71046 X-RAY EXAM CHEST 2 VIEWS: CPT

## 2022-04-25 PROCEDURE — 86706 HEP B SURFACE ANTIBODY: CPT

## 2022-04-25 PROCEDURE — 84443 ASSAY THYROID STIM HORMONE: CPT

## 2022-04-25 PROCEDURE — 82306 VITAMIN D 25 HYDROXY: CPT

## 2022-04-25 PROCEDURE — 86803 HEPATITIS C AB TEST: CPT

## 2022-04-25 PROCEDURE — 80053 COMPREHEN METABOLIC PANEL: CPT

## 2022-04-25 PROCEDURE — 36415 COLL VENOUS BLD VENIPUNCTURE: CPT

## 2022-04-25 PROCEDURE — 87389 HIV-1 AG W/HIV-1&-2 AB AG IA: CPT

## 2022-04-26 ENCOUNTER — OFFICE VISIT (OUTPATIENT)
Dept: FAMILY MEDICINE CLINIC | Facility: CLINIC | Age: 52
End: 2022-04-26
Payer: COMMERCIAL

## 2022-04-26 VITALS
WEIGHT: 141 LBS | SYSTOLIC BLOOD PRESSURE: 124 MMHG | HEART RATE: 68 BPM | DIASTOLIC BLOOD PRESSURE: 68 MMHG | HEIGHT: 67 IN | RESPIRATION RATE: 20 BRPM | TEMPERATURE: 98.9 F | BODY MASS INDEX: 22.13 KG/M2

## 2022-04-26 DIAGNOSIS — L02.91 ABSCESS: ICD-10-CM

## 2022-04-26 DIAGNOSIS — G62.9 NEUROPATHY: ICD-10-CM

## 2022-04-26 DIAGNOSIS — F31.70 BIPOLAR DISORDER IN FULL REMISSION, MOST RECENT EPISODE UNSPECIFIED TYPE (HCC): ICD-10-CM

## 2022-04-26 DIAGNOSIS — E03.9 HYPOTHYROIDISM, UNSPECIFIED TYPE: Primary | ICD-10-CM

## 2022-04-26 DIAGNOSIS — K21.9 GASTROESOPHAGEAL REFLUX DISEASE WITHOUT ESOPHAGITIS: ICD-10-CM

## 2022-04-26 DIAGNOSIS — J45.40 MODERATE PERSISTENT ASTHMA WITHOUT COMPLICATION: ICD-10-CM

## 2022-04-26 DIAGNOSIS — F41.0 SEVERE ANXIETY WITH PANIC: ICD-10-CM

## 2022-04-26 LAB — HIV 1+2 AB+HIV1 P24 AG SERPL QL IA: NORMAL

## 2022-04-26 PROCEDURE — 99214 OFFICE O/P EST MOD 30 MIN: CPT | Performed by: FAMILY MEDICINE

## 2022-04-26 RX ORDER — GABAPENTIN 100 MG/1
100 CAPSULE ORAL 3 TIMES DAILY
Qty: 90 CAPSULE | Refills: 5 | Status: SHIPPED | OUTPATIENT
Start: 2022-04-26 | End: 2022-05-26

## 2022-04-26 RX ORDER — AMOXICILLIN 500 MG/1
500 CAPSULE ORAL EVERY 8 HOURS SCHEDULED
Qty: 30 CAPSULE | Refills: 0 | Status: SHIPPED | OUTPATIENT
Start: 2022-04-26 | End: 2022-05-06

## 2022-04-26 RX ORDER — ALPRAZOLAM 0.5 MG/1
0.5 TABLET ORAL 3 TIMES DAILY PRN
Qty: 90 TABLET | Refills: 5 | Status: SHIPPED | OUTPATIENT
Start: 2022-04-26 | End: 2022-05-26

## 2022-04-26 RX ORDER — ARIPIPRAZOLE 2 MG/1
2 TABLET ORAL DAILY
Qty: 30 TABLET | Refills: 5 | Status: SHIPPED | OUTPATIENT
Start: 2022-04-26

## 2022-04-26 RX ORDER — LEVOTHYROXINE SODIUM 0.03 MG/1
25 TABLET ORAL
Qty: 90 TABLET | Refills: 3 | Status: SHIPPED | OUTPATIENT
Start: 2022-04-26 | End: 2022-05-16 | Stop reason: DRUGHIGH

## 2022-04-26 NOTE — PROGRESS NOTES
Assessment/Plan:  Bipolar disorder with severe anxiety and panic and depression adequately managed on the current regimen  Lithium Abilify and Catapres has the bipolar disorder in full remission  The patient is functioning well  PDMP has been reviewed no issues found no evidence of diversion or abuse  Lithium level is therapeutic  Xanax improves function  Subcentimeter abscess on right chest will prescribe amoxicillin 500 t i d  And patient was instructed to use warm compresses  Hypothyroidism with TSH elevated to 7 9 will order an ultrasound of the thyroid and begin levothyroxine at 25 mcg with a repeat check on the TSH in 1 month    Bilateral neuropathy of the feet symptoms are minimized with gabapentin 100 mg t i d  And B6 100 mg daily    Occasional nausea relieved with Zofran    Body movement disorder improved with Cogentin 0 5 mg    GERD with symptoms minimized with Prilosec    Moderate persistent asthma without complication treated with Atrovent    Problem List Items Addressed This Visit     None      Visit Diagnoses     Severe anxiety with panic        Neuropathy               Diagnoses and all orders for this visit:    Severe anxiety with panic    Neuropathy        No problem-specific Assessment & Plan notes found for this encounter  PHQ-2/9 Depression Screening    Little interest or pleasure in doing things: 0 - not at all  Feeling down, depressed, or hopeless: 0 - not at all  PHQ-2 Score: 0  PHQ-2 Interpretation: Negative depression screen          Body mass index is 22 08 kg/m²  BMI Counseling: Body mass index is 22 08 kg/m²  The BMI   Subjective:      Patient ID: Gavi Benítez is a 45 y o  female      Patient presents for checkup on bipolar disorder with severe anxiety neuropathy GERD and asthma      The following portions of the patient's history were reviewed and updated as appropriate:   She has a past medical history of ADD (attention deficit disorder), Colitis, ulcerative ([address] ), and Depression  ,  does not have a problem list on file  ,   has a past surgical history that includes Tubal ligation and Endometrial ablation  ,  family history is not on file  ,   reports that she quit smoking about 8 years ago  She has never used smokeless tobacco  She reports that she does not drink alcohol and does not use drugs  ,  is allergic to dust mite extract     Current Outpatient Medications   Medication Sig Dispense Refill    ALPRAZolam (XANAX) 0 5 mg tablet Take 1 tablet (0 5 mg total) by mouth 3 (three) times a day as needed for anxiety 90 tablet 2    ARIPiprazole (ABILIFY) 2 mg tablet Take 1 tablet (2 mg total) by mouth daily 30 tablet 2    benztropine (COGENTIN) 0 5 mg tablet Take 1 tablet (0 5 mg total) by mouth 2 (two) times a day 60 tablet 5    cloNIDine (CATAPRES) 0 2 mg tablet Take 1 tablet (0 2 mg total) by mouth daily 90 tablet 1    Conj Estrog-Medroxyprogest Ace (PREMPRO PO) Take 1 tablet by mouth daily   gabapentin (NEURONTIN) 100 mg capsule Take 1 capsule (100 mg total) by mouth 3 (three) times a day 90 capsule 5    hydrocortisone 1 % lotion Apply topically 2 (two) times a day 118 mL 0    ipratropium (Atrovent HFA) 17 mcg/act inhaler Inhale 1 puff 4 (four) times a day 12 9 g 3    lithium carbonate 300 mg capsule Take 3 capsules (900 mg total) by mouth daily at bedtime 90 capsule 2    mupirocin (BACTROBAN) 2 % nasal ointment into each nostril 2 (two) times a day 10 g 0    omeprazole (PriLOSEC) 20 mg delayed release capsule Take 1 capsule (20 mg total) by mouth daily before breakfast 90 capsule 2    ondansetron (ZOFRAN) 4 mg tablet Take 1 tablet (4 mg total) by mouth every 8 (eight) hours as needed for nausea or vomiting 20 tablet 0    Pyridoxine HCl (vitamin B-6) 100 MG TABS Take 1 tablet (100 mg total) by mouth daily 30 tablet 5     No current facility-administered medications for this visit  Review of Systems   Constitutional: Negative for chills and fever     HENT: Negative for ear pain and sore throat  Eyes: Negative for pain and visual disturbance  Respiratory: Negative for cough and shortness of breath  Cardiovascular: Negative for chest pain and palpitations  Gastrointestinal: Negative for abdominal pain and vomiting  Genitourinary: Negative for dysuria and hematuria  Musculoskeletal: Positive for arthralgias  Negative for back pain  Skin: Negative for color change and rash  Neurological: Positive for numbness  Negative for seizures and syncope  Numbness of the lower extremities bilaterally   All other systems reviewed and are negative  Objective:    /68   Pulse 68   Temp 98 9 °F (37 2 °C)   Resp 20   Ht 5' 7" (1 702 m)   Wt 64 kg (141 lb)   LMP 05/02/2016 (Approximate) Comment: pre-menopause  BMI 22 08 kg/m²   Body mass index is 22 08 kg/m²  Physical Exam  Constitutional:       Appearance: She is well-developed  HENT:      Head: Normocephalic  Eyes:      Pupils: Pupils are equal, round, and reactive to light  Cardiovascular:      Rate and Rhythm: Normal rate and regular rhythm  Heart sounds: Normal heart sounds  Pulmonary:      Effort: Pulmonary effort is normal       Breath sounds: Normal breath sounds  Chest:          Comments: Area of abscess subcentimeter  Abdominal:      General: Bowel sounds are normal       Palpations: Abdomen is soft  Tenderness: There is no abdominal tenderness  Musculoskeletal:      Cervical back: Normal range of motion  Skin:     General: Skin is warm  Neurological:      Mental Status: She is alert and oriented to person, place, and time

## 2022-04-29 DIAGNOSIS — R11.0 NAUSEA: ICD-10-CM

## 2022-04-29 RX ORDER — ONDANSETRON 4 MG/1
TABLET, FILM COATED ORAL
Qty: 20 TABLET | Refills: 0 | Status: SHIPPED | OUTPATIENT
Start: 2022-04-29 | End: 2022-07-08 | Stop reason: SDUPTHER

## 2022-05-06 ENCOUNTER — TELEPHONE (OUTPATIENT)
Dept: ADMINISTRATIVE | Facility: OTHER | Age: 52
End: 2022-05-06

## 2022-05-06 NOTE — TELEPHONE ENCOUNTER
----- Message from Milka Vanegas sent at 5/6/2022  7:32 AM EDT -----  Regarding: Care Gaps Request  05/06/22 7:32 AM    Hello, our patient Bebo Daniel has had Mammogram completed/performed  Please assist in updating the patient chart by pulling the Care Everywhere (CE) document  The date of service is 08/27/2021       Thank you,  Milka Vanegas  [facility]

## 2022-05-06 NOTE — TELEPHONE ENCOUNTER
Upon review of the In Basket request we were able to locate, review, and update the patient chart as requested for Mammogram     Any additional questions or concerns should be emailed to the Practice Liaisons via [email or web address] email, please do not reply via In Basket      Thank you  Aidan Huffman MA

## 2022-05-12 ENCOUNTER — APPOINTMENT (OUTPATIENT)
Dept: LAB | Facility: HOSPITAL | Age: 52
End: 2022-05-12
Payer: COMMERCIAL

## 2022-05-12 ENCOUNTER — HOSPITAL ENCOUNTER (OUTPATIENT)
Dept: ULTRASOUND IMAGING | Facility: HOSPITAL | Age: 52
Discharge: HOME | End: 2022-05-12
Payer: COMMERCIAL

## 2022-05-12 DIAGNOSIS — Z13.1 SCREENING FOR DIABETES MELLITUS: ICD-10-CM

## 2022-05-12 DIAGNOSIS — E03.9 HYPOTHYROIDISM, UNSPECIFIED TYPE: ICD-10-CM

## 2022-05-12 DIAGNOSIS — Z13.220 SCREENING FOR CHOLESTEROL LEVEL: ICD-10-CM

## 2022-05-12 LAB
CHOLEST SERPL-MCNC: 230 MG/DL
HDLC SERPL-MCNC: 102 MG/DL
LDLC SERPL CALC-MCNC: 112 MG/DL (ref 0–100)
TRIGL SERPL-MCNC: 82 MG/DL
TSH SERPL DL<=0.05 MIU/L-ACNC: 6.93 UIU/ML (ref 0.45–4.5)

## 2022-05-12 PROCEDURE — 84443 ASSAY THYROID STIM HORMONE: CPT

## 2022-05-12 PROCEDURE — 86800 THYROGLOBULIN ANTIBODY: CPT

## 2022-05-12 PROCEDURE — 80061 LIPID PANEL: CPT

## 2022-05-12 PROCEDURE — 76536 US EXAM OF HEAD AND NECK: CPT

## 2022-05-12 PROCEDURE — 36415 COLL VENOUS BLD VENIPUNCTURE: CPT

## 2022-05-12 PROCEDURE — 86376 MICROSOMAL ANTIBODY EACH: CPT

## 2022-05-13 ENCOUNTER — APPOINTMENT (OUTPATIENT)
Dept: LAB | Facility: CLINIC | Age: 52
End: 2022-05-13
Payer: COMMERCIAL

## 2022-05-13 ENCOUNTER — TELEPHONE (OUTPATIENT)
Dept: FAMILY MEDICINE CLINIC | Facility: CLINIC | Age: 52
End: 2022-05-13

## 2022-05-13 DIAGNOSIS — E03.9 HYPOTHYROIDISM, UNSPECIFIED TYPE: Primary | ICD-10-CM

## 2022-05-13 LAB
GLUCOSE P FAST SERPL-MCNC: 103 MG/DL (ref 65–99)
THYROGLOB AB SERPL-ACNC: 17 IU/ML (ref 0–0.9)
THYROPEROXIDASE AB SERPL-ACNC: <8 IU/ML (ref 0–34)

## 2022-05-13 PROCEDURE — 36415 COLL VENOUS BLD VENIPUNCTURE: CPT

## 2022-05-13 PROCEDURE — 82947 ASSAY GLUCOSE BLOOD QUANT: CPT

## 2022-05-13 NOTE — TELEPHONE ENCOUNTER
----- Message from Kaitlynn Vásquez DO sent at 5/13/2022 10:02 AM EDT -----  Thyroid ultrasound is okay there is a nodule but it is insignificant and needs no follow-up

## 2022-05-13 NOTE — TELEPHONE ENCOUNTER
Patient is questioning if labs were reviewed as well, is she to continue on the same dose of synthroid?

## 2022-05-16 DIAGNOSIS — F31.70 BIPOLAR DISORDER IN FULL REMISSION, MOST RECENT EPISODE UNSPECIFIED TYPE (HCC): ICD-10-CM

## 2022-05-16 RX ORDER — LEVOTHYROXINE SODIUM 0.05 MG/1
50 TABLET ORAL
Qty: 90 TABLET | Refills: 3 | Status: SHIPPED | OUTPATIENT
Start: 2022-05-16

## 2022-05-16 RX ORDER — LITHIUM CARBONATE 300 MG/1
CAPSULE ORAL
Qty: 90 CAPSULE | Refills: 2 | Status: SHIPPED | OUTPATIENT
Start: 2022-05-16

## 2022-07-08 DIAGNOSIS — R11.0 NAUSEA: ICD-10-CM

## 2022-07-08 DIAGNOSIS — F31.70 BIPOLAR DISORDER IN FULL REMISSION, MOST RECENT EPISODE UNSPECIFIED TYPE (HCC): ICD-10-CM

## 2022-07-08 DIAGNOSIS — T88.7XXA SIDE EFFECT OF DRUG: ICD-10-CM

## 2022-07-08 RX ORDER — CLONIDINE HYDROCHLORIDE 0.2 MG/1
0.2 TABLET ORAL DAILY
Qty: 90 TABLET | Refills: 0 | Status: SHIPPED | OUTPATIENT
Start: 2022-07-08 | End: 2022-09-26 | Stop reason: SDUPTHER

## 2022-07-08 RX ORDER — BENZTROPINE MESYLATE 0.5 MG/1
0.5 TABLET ORAL 2 TIMES DAILY
Qty: 180 TABLET | Refills: 0 | Status: SHIPPED | OUTPATIENT
Start: 2022-07-08 | End: 2022-09-26 | Stop reason: SDUPTHER

## 2022-07-08 RX ORDER — ONDANSETRON 4 MG/1
4 TABLET, FILM COATED ORAL EVERY 8 HOURS PRN
Qty: 20 TABLET | Refills: 1 | Status: SHIPPED | OUTPATIENT
Start: 2022-07-08

## 2022-08-23 DIAGNOSIS — F31.70 BIPOLAR DISORDER IN FULL REMISSION, MOST RECENT EPISODE UNSPECIFIED TYPE (HCC): ICD-10-CM

## 2022-08-25 RX ORDER — LITHIUM CARBONATE 300 MG/1
900 CAPSULE ORAL
Qty: 90 CAPSULE | Refills: 2 | Status: SHIPPED | OUTPATIENT
Start: 2022-08-25 | End: 2022-10-25 | Stop reason: SDUPTHER

## 2022-09-26 DIAGNOSIS — G62.9 NEUROPATHY: ICD-10-CM

## 2022-09-26 DIAGNOSIS — F31.70 BIPOLAR DISORDER IN FULL REMISSION, MOST RECENT EPISODE UNSPECIFIED TYPE (HCC): ICD-10-CM

## 2022-09-26 DIAGNOSIS — T88.7XXA SIDE EFFECT OF DRUG: ICD-10-CM

## 2022-09-26 RX ORDER — CLONIDINE HYDROCHLORIDE 0.2 MG/1
0.2 TABLET ORAL DAILY
Qty: 90 TABLET | Refills: 0 | Status: SHIPPED | OUTPATIENT
Start: 2022-09-26 | End: 2022-10-25 | Stop reason: SDUPTHER

## 2022-09-26 RX ORDER — GABAPENTIN 100 MG/1
100 CAPSULE ORAL 3 TIMES DAILY
Qty: 90 CAPSULE | Refills: 0 | Status: SHIPPED | OUTPATIENT
Start: 2022-09-26 | End: 2022-10-26

## 2022-09-26 RX ORDER — BENZTROPINE MESYLATE 0.5 MG/1
0.5 TABLET ORAL 2 TIMES DAILY
Qty: 180 TABLET | Refills: 0 | Status: SHIPPED | OUTPATIENT
Start: 2022-09-26 | End: 2022-10-25 | Stop reason: SDUPTHER

## 2022-11-21 ENCOUNTER — TELEPHONE (OUTPATIENT)
Dept: FAMILY MEDICINE CLINIC | Facility: CLINIC | Age: 52
End: 2022-11-21

## 2022-11-21 DIAGNOSIS — F41.0 SEVERE ANXIETY WITH PANIC: ICD-10-CM

## 2022-11-21 DIAGNOSIS — F31.70 BIPOLAR DISORDER IN FULL REMISSION, MOST RECENT EPISODE UNSPECIFIED TYPE (HCC): ICD-10-CM

## 2022-11-21 DIAGNOSIS — T88.7XXA SIDE EFFECT OF DRUG: ICD-10-CM

## 2022-11-21 RX ORDER — ARIPIPRAZOLE 2 MG/1
2 TABLET ORAL DAILY
Qty: 30 TABLET | Refills: 0 | Status: SHIPPED | OUTPATIENT
Start: 2022-11-21

## 2022-11-21 RX ORDER — BENZTROPINE MESYLATE 0.5 MG/1
0.5 TABLET ORAL 2 TIMES DAILY
Qty: 60 TABLET | Refills: 0 | Status: SHIPPED | OUTPATIENT
Start: 2022-11-21

## 2022-11-21 RX ORDER — CLONIDINE HYDROCHLORIDE 0.2 MG/1
0.2 TABLET ORAL DAILY
Qty: 30 TABLET | Refills: 0 | Status: SHIPPED | OUTPATIENT
Start: 2022-11-21

## 2022-11-21 RX ORDER — LITHIUM CARBONATE 300 MG/1
900 CAPSULE ORAL
Qty: 90 CAPSULE | Refills: 0 | Status: SHIPPED | OUTPATIENT
Start: 2022-11-21

## 2022-11-21 NOTE — TELEPHONE ENCOUNTER
Patient rescheduled her appt for next month, her day is end stage lung cancer and she is [place] different [place] with no way to get a flight at this time, she would like her medications filled for a month to [address] [facility] [place] [id number] telephone [phone]- abilify, lithium  Carbonate, clonidine, benztropine